# Patient Record
Sex: FEMALE | Race: WHITE
[De-identification: names, ages, dates, MRNs, and addresses within clinical notes are randomized per-mention and may not be internally consistent; named-entity substitution may affect disease eponyms.]

---

## 2018-09-11 ENCOUNTER — HOSPITAL ENCOUNTER (EMERGENCY)
Dept: HOSPITAL 83 - ED | Age: 63
Discharge: HOME | End: 2018-09-11
Payer: MEDICARE

## 2018-09-11 VITALS — BODY MASS INDEX: 37.89 KG/M2 | WEIGHT: 250 LBS | HEIGHT: 67.99 IN

## 2018-09-11 VITALS — DIASTOLIC BLOOD PRESSURE: 72 MMHG

## 2018-09-11 DIAGNOSIS — S52.691A: ICD-10-CM
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
## 2018-09-11 DIAGNOSIS — Z88.5: ICD-10-CM

## 2018-09-11 DIAGNOSIS — Z98.84: ICD-10-CM

## 2018-09-11 DIAGNOSIS — Z91.018: ICD-10-CM

## 2018-09-11 DIAGNOSIS — Z79.899: ICD-10-CM
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
## 2018-09-11 DIAGNOSIS — S52.591A: Primary | ICD-10-CM

## 2018-09-19 ENCOUNTER — HOSPITAL ENCOUNTER (OUTPATIENT)
Dept: HOSPITAL 83 - US | Age: 63
Discharge: HOME | End: 2018-09-19
Attending: INTERNAL MEDICINE
Payer: MEDICARE

## 2018-09-19 DIAGNOSIS — R60.0: Primary | ICD-10-CM

## 2019-10-25 ENCOUNTER — HOSPITAL ENCOUNTER (EMERGENCY)
Dept: HOSPITAL 83 - ED | Age: 64
Discharge: HOME | End: 2019-10-25
Payer: MEDICARE

## 2019-10-25 VITALS — WEIGHT: 270 LBS | HEIGHT: 68.98 IN | BODY MASS INDEX: 39.99 KG/M2

## 2019-10-25 VITALS — DIASTOLIC BLOOD PRESSURE: 72 MMHG

## 2019-10-25 DIAGNOSIS — Z79.899: ICD-10-CM

## 2019-10-25 DIAGNOSIS — K29.70: Primary | ICD-10-CM

## 2019-10-25 DIAGNOSIS — Z90.49: ICD-10-CM

## 2019-10-25 DIAGNOSIS — Z88.6: ICD-10-CM

## 2019-10-25 DIAGNOSIS — Z98.84: ICD-10-CM

## 2019-10-25 DIAGNOSIS — Z91.018: ICD-10-CM

## 2019-10-25 LAB
ALBUMIN SERPL-MCNC: 3.4 GM/DL (ref 3.1–4.5)
ALP SERPL-CCNC: 137 U/L (ref 45–117)
ALT SERPL W P-5'-P-CCNC: 16 U/L (ref 12–78)
APPEARANCE UR: (no result)
AST SERPL-CCNC: 15 IU/L (ref 3–35)
BACTERIA #/AREA URNS HPF: (no result) /[HPF]
BASOPHILS # BLD AUTO: 2 % (ref 0–1)
BILIRUB UR QL STRIP: NEGATIVE
BUN SERPL-MCNC: 13 MG/DL (ref 7–24)
CHLORIDE SERPL-SCNC: 110 MMOL/L (ref 98–107)
COLOR UR: YELLOW
CREAT SERPL-MCNC: 0.8 MG/DL (ref 0.55–1.02)
EPI CELLS #/AREA URNS HPF: (no result) /[HPF]
ERYTHROCYTE [DISTWIDTH] IN BLOOD BY AUTOMATED COUNT: 13.9 % (ref 0–14.5)
GLUCOSE UR QL: NEGATIVE
HCT VFR BLD AUTO: 39.3 % (ref 37–47)
HGB BLD-MCNC: 12.1 G/DL (ref 12–16)
HGB UR QL STRIP: NEGATIVE
KETONES UR QL STRIP: NEGATIVE
LEUKOCYTE ESTERASE UR QL STRIP: NEGATIVE
LIPASE SERPL-CCNC: 61 U/L (ref 73–393)
MCH RBC QN AUTO: 26.1 PG (ref 27–31)
MCHC RBC AUTO-ENTMCNC: 30.8 G/DL (ref 33–37)
MCV RBC AUTO: 84.9 FL (ref 81–99)
MUCOUS THREADS URNS QL MICRO: (no result)
NITRITE UR QL STRIP: NEGATIVE
NRBC BLD QL AUTO: 0 10*3/UL (ref 0–0)
OVALOCYTES BLD QL SMEAR: (no result)
PH UR STRIP: 6.5 [PH] (ref 5–9)
PLATELET # BLD AUTO: 249 10*3/UL (ref 130–400)
PLATELET SUFFICIENCY: NORMAL
PMV BLD AUTO: 10.7 FL (ref 9.6–12.3)
POTASSIUM SERPL-SCNC: 4.4 MMOL/L (ref 3.5–5.1)
PROT SERPL-MCNC: 7.1 GM/DL (ref 6.4–8.2)
RBC # BLD AUTO: 4.63 10*6/UL (ref 4.1–5.1)
SODIUM SERPL-SCNC: 142 MMOL/L (ref 136–145)
SP GR UR: 1.02 (ref 1–1.03)
TOTAL CELLS COUNTED: 100 #CELLS
UROBILINOGEN UR STRIP-MCNC: 2 E.U./DL (ref 0.2–1)
WBC #/AREA URNS HPF: (no result) WBC/HPF (ref 0–5)
WBC NRBC COR # BLD AUTO: 7.3 10*3/UL (ref 4.8–10.8)

## 2019-11-13 ENCOUNTER — HOSPITAL ENCOUNTER (OUTPATIENT)
Dept: HOSPITAL 83 - RAD | Age: 64
Discharge: HOME | End: 2019-11-13
Attending: ORTHOPAEDIC SURGERY
Payer: MEDICARE

## 2019-11-13 DIAGNOSIS — M47.816: ICD-10-CM

## 2019-11-13 DIAGNOSIS — R10.813: ICD-10-CM

## 2019-11-13 DIAGNOSIS — M51.36: Primary | ICD-10-CM

## 2019-11-13 DIAGNOSIS — Z96.641: ICD-10-CM

## 2020-03-11 ENCOUNTER — HOSPITAL ENCOUNTER (EMERGENCY)
Dept: HOSPITAL 83 - ED | Age: 65
Discharge: HOME | End: 2020-03-11
Payer: MEDICARE

## 2020-03-11 VITALS — WEIGHT: 270 LBS | BODY MASS INDEX: 40.92 KG/M2 | HEIGHT: 67.99 IN

## 2020-03-11 VITALS — DIASTOLIC BLOOD PRESSURE: 70 MMHG

## 2020-03-11 DIAGNOSIS — F32.9: ICD-10-CM

## 2020-03-11 DIAGNOSIS — M19.90: ICD-10-CM

## 2020-03-11 DIAGNOSIS — Z88.8: ICD-10-CM

## 2020-03-11 DIAGNOSIS — F41.9: ICD-10-CM

## 2020-03-11 DIAGNOSIS — E78.00: ICD-10-CM

## 2020-03-11 DIAGNOSIS — Z79.899: ICD-10-CM

## 2020-03-11 DIAGNOSIS — Z86.73: ICD-10-CM

## 2020-03-11 DIAGNOSIS — I10: ICD-10-CM

## 2020-03-11 DIAGNOSIS — M54.9: Primary | ICD-10-CM

## 2020-03-11 DIAGNOSIS — G89.29: ICD-10-CM

## 2020-03-11 DIAGNOSIS — Z91.018: ICD-10-CM

## 2020-05-04 ENCOUNTER — HOSPITAL ENCOUNTER (OUTPATIENT)
Age: 65
Discharge: HOME | End: 2020-05-06

## 2020-05-04 LAB
ABO/RH: NORMAL
ANTIBODY SCREEN: NORMAL

## 2020-05-04 PROCEDURE — 86901 BLOOD TYPING SEROLOGIC RH(D): CPT

## 2020-05-04 PROCEDURE — 86900 BLOOD TYPING SEROLOGIC ABO: CPT

## 2020-05-04 PROCEDURE — 87081 CULTURE SCREEN ONLY: CPT

## 2020-05-04 PROCEDURE — 86850 RBC ANTIBODY SCREEN: CPT

## 2020-05-06 LAB — MRSA CULTURE ONLY: NORMAL

## 2020-05-12 ENCOUNTER — HOSPITAL ENCOUNTER (OUTPATIENT)
Age: 65
Discharge: HOME | End: 2020-05-14

## 2020-05-12 LAB
ANION GAP SERPL CALCULATED.3IONS-SCNC: 9 MMOL/L (ref 7–16)
BUN BLDV-MCNC: 13 MG/DL (ref 8–23)
CALCIUM SERPL-MCNC: 8.8 MG/DL (ref 8.6–10.2)
CHLORIDE BLD-SCNC: 102 MMOL/L (ref 98–107)
CO2: 26 MMOL/L (ref 22–29)
CREAT SERPL-MCNC: 0.8 MG/DL (ref 0.5–1)
GFR AFRICAN AMERICAN: >60
GFR NON-AFRICAN AMERICAN: >60 ML/MIN/1.73
GLUCOSE BLD-MCNC: 127 MG/DL (ref 74–99)
HCT VFR BLD CALC: 37 % (ref 34–48)
HEMOGLOBIN: 11.7 G/DL (ref 11.5–15.5)
MCH RBC QN AUTO: 27.9 PG (ref 26–35)
MCHC RBC AUTO-ENTMCNC: 31.6 % (ref 32–34.5)
MCV RBC AUTO: 88.3 FL (ref 80–99.9)
PDW BLD-RTO: 15.5 FL (ref 11.5–15)
PLATELET # BLD: 227 E9/L (ref 130–450)
PMV BLD AUTO: 10.7 FL (ref 7–12)
POTASSIUM SERPL-SCNC: 4.6 MMOL/L (ref 3.5–5)
RBC # BLD: 4.19 E12/L (ref 3.5–5.5)
SODIUM BLD-SCNC: 137 MMOL/L (ref 132–146)
WBC # BLD: 9.4 E9/L (ref 4.5–11.5)

## 2020-05-12 PROCEDURE — 80048 BASIC METABOLIC PNL TOTAL CA: CPT

## 2020-05-12 PROCEDURE — 85027 COMPLETE CBC AUTOMATED: CPT

## 2020-05-13 ENCOUNTER — HOSPITAL ENCOUNTER (OUTPATIENT)
Age: 65
Discharge: HOME | End: 2020-05-15

## 2020-05-13 LAB
ANION GAP SERPL CALCULATED.3IONS-SCNC: 10 MMOL/L (ref 7–16)
BUN BLDV-MCNC: 13 MG/DL (ref 8–23)
CALCIUM SERPL-MCNC: 9.2 MG/DL (ref 8.6–10.2)
CHLORIDE BLD-SCNC: 99 MMOL/L (ref 98–107)
CO2: 29 MMOL/L (ref 22–29)
CREAT SERPL-MCNC: 0.9 MG/DL (ref 0.5–1)
GFR AFRICAN AMERICAN: >60
GFR NON-AFRICAN AMERICAN: >60 ML/MIN/1.73
GLUCOSE BLD-MCNC: 121 MG/DL (ref 74–99)
HCT VFR BLD CALC: 39.1 % (ref 34–48)
HEMOGLOBIN: 12.3 G/DL (ref 11.5–15.5)
MCH RBC QN AUTO: 28.1 PG (ref 26–35)
MCHC RBC AUTO-ENTMCNC: 31.5 % (ref 32–34.5)
MCV RBC AUTO: 89.5 FL (ref 80–99.9)
PDW BLD-RTO: 16.3 FL (ref 11.5–15)
PLATELET # BLD: 228 E9/L (ref 130–450)
PMV BLD AUTO: 10.5 FL (ref 7–12)
POTASSIUM SERPL-SCNC: 4.3 MMOL/L (ref 3.5–5)
RBC # BLD: 4.37 E12/L (ref 3.5–5.5)
SODIUM BLD-SCNC: 138 MMOL/L (ref 132–146)
WBC # BLD: 9.7 E9/L (ref 4.5–11.5)

## 2020-05-13 PROCEDURE — 85027 COMPLETE CBC AUTOMATED: CPT

## 2020-05-13 PROCEDURE — 80048 BASIC METABOLIC PNL TOTAL CA: CPT

## 2020-07-27 ENCOUNTER — HOSPITAL ENCOUNTER (OUTPATIENT)
Dept: HOSPITAL 83 - COVID19 | Age: 65
Discharge: HOME | End: 2020-07-27
Attending: INTERNAL MEDICINE
Payer: MEDICARE

## 2020-07-27 DIAGNOSIS — R50.9: Primary | ICD-10-CM

## 2020-07-27 DIAGNOSIS — Z20.828: ICD-10-CM

## 2021-05-13 ENCOUNTER — HOSPITAL ENCOUNTER (OUTPATIENT)
Dept: HOSPITAL 83 - LAB | Age: 66
Discharge: HOME | End: 2021-05-13
Attending: PHYSICIAN ASSISTANT
Payer: MEDICARE

## 2021-05-13 DIAGNOSIS — L40.8: ICD-10-CM

## 2021-05-13 DIAGNOSIS — I73.00: Primary | ICD-10-CM

## 2021-05-13 DIAGNOSIS — L60.3: ICD-10-CM

## 2021-05-13 LAB
ALBUMIN SERPL-MCNC: 3.4 GM/DL (ref 3.1–4.5)
ALP SERPL-CCNC: 115 U/L (ref 45–117)
ALT SERPL W P-5'-P-CCNC: 13 U/L (ref 12–78)
AST SERPL-CCNC: 8 IU/L (ref 3–35)
BASOPHILS # BLD AUTO: 0.1 10*3/UL (ref 0–0.1)
BASOPHILS NFR BLD AUTO: 0.7 % (ref 0–1)
BUN SERPL-MCNC: 14 MG/DL (ref 7–24)
CHLORIDE SERPL-SCNC: 104 MMOL/L (ref 98–107)
CREAT SERPL-MCNC: 0.94 MG/DL (ref 0.55–1.02)
EOSINOPHIL # BLD AUTO: 0.3 10*3/UL (ref 0–0.4)
EOSINOPHIL # BLD AUTO: 3.8 % (ref 1–4)
ERYTHROCYTE [DISTWIDTH] IN BLOOD BY AUTOMATED COUNT: 14.2 % (ref 0–14.5)
HCT VFR BLD AUTO: 38.1 % (ref 37–47)
LYMPHOCYTES # BLD AUTO: 1.4 10*3/UL (ref 1.3–4.4)
LYMPHOCYTES NFR BLD AUTO: 19.1 % (ref 27–41)
MCH RBC QN AUTO: 26.3 PG (ref 27–31)
MCHC RBC AUTO-ENTMCNC: 31.2 G/DL (ref 33–37)
MCV RBC AUTO: 84.3 FL (ref 81–99)
MONOCYTES # BLD AUTO: 0.5 10*3/UL (ref 0.1–1)
MONOCYTES NFR BLD MANUAL: 6.7 % (ref 3–9)
NEUT #: 4.9 10*3/UL (ref 2.3–7.9)
NEUT %: 69.1 % (ref 47–73)
NRBC BLD QL AUTO: 0 10*3/UL (ref 0–0)
PLATELET # BLD AUTO: 335 10*3/UL (ref 130–400)
PMV BLD AUTO: 10.4 FL (ref 9.6–12.3)
POTASSIUM SERPL-SCNC: 4.1 MMOL/L (ref 3.5–5.1)
PROT SERPL-MCNC: 7.5 GM/DL (ref 6.4–8.2)
RBC # BLD AUTO: 4.52 10*6/UL (ref 4.1–5.1)
SODIUM SERPL-SCNC: 138 MMOL/L (ref 136–145)
T4 FREE SERPL-MCNC: 1.13 NG/DL (ref 0.76–1.46)
TSH SERPL DL<=0.005 MIU/L-ACNC: 2.51 UIU/ML (ref 0.36–4.75)
WBC NRBC COR # BLD AUTO: 7.1 10*3/UL (ref 4.8–10.8)

## 2021-06-21 ENCOUNTER — HOSPITAL ENCOUNTER (OUTPATIENT)
Dept: HOSPITAL 83 - MAMMO | Age: 66
Discharge: HOME | End: 2021-06-21
Attending: INTERNAL MEDICINE
Payer: MEDICARE

## 2021-06-21 DIAGNOSIS — Z12.31: Primary | ICD-10-CM

## 2022-05-28 ENCOUNTER — HOSPITAL ENCOUNTER (INPATIENT)
Age: 67
LOS: 7 days | Discharge: NURSING, REHAB OR LONG-TERM CARE | DRG: 246 | End: 2022-06-06
Attending: STUDENT IN AN ORGANIZED HEALTH CARE EDUCATION/TRAINING PROGRAM | Admitting: INTERNAL MEDICINE
Payer: MEDICARE

## 2022-05-28 ENCOUNTER — HOSPITAL ENCOUNTER (EMERGENCY)
Dept: HOSPITAL 83 - ED | Age: 67
Discharge: ANOTHER HOSPITAL | End: 2022-05-28
Payer: MEDICARE

## 2022-05-28 VITALS — SYSTOLIC BLOOD PRESSURE: 129 MMHG | DIASTOLIC BLOOD PRESSURE: 74 MMHG

## 2022-05-28 VITALS — HEIGHT: 55 IN

## 2022-05-28 DIAGNOSIS — I21.3: Primary | ICD-10-CM

## 2022-05-28 DIAGNOSIS — M79.7 FIBROMYALGIA: Primary | ICD-10-CM

## 2022-05-28 LAB
ABO/RH: NORMAL
ALBUMIN SERPL-MCNC: 4 G/DL (ref 3.5–5.2)
ALP BLD-CCNC: 116 U/L (ref 35–104)
ALP SERPL-CCNC: 99 U/L (ref 45–117)
ALT SERPL W P-5'-P-CCNC: 18 U/L (ref 12–78)
ALT SERPL-CCNC: 55 U/L (ref 0–32)
ANION GAP SERPL CALCULATED.3IONS-SCNC: 12 MMOL/L (ref 7–16)
ANTIBODY SCREEN: NORMAL
APTT PPP: 24.8 SECONDS (ref 20–32.1)
AST SERPL-CCNC: 13 IU/L (ref 3–35)
AST SERPL-CCNC: 251 U/L (ref 0–31)
BASOPHILS # BLD AUTO: 0 10*3/UL (ref 0–0.1)
BASOPHILS NFR BLD AUTO: 0.6 % (ref 0–1)
BILIRUB SERPL-MCNC: 0.6 MG/DL (ref 0–1.2)
BUN BLDV-MCNC: 17 MG/DL (ref 6–23)
BUN SERPL-MCNC: 17 MG/DL (ref 7–24)
CALCIUM SERPL-MCNC: 9 MG/DL (ref 8.6–10.2)
CHLORIDE BLD-SCNC: 100 MMOL/L (ref 98–107)
CHLORIDE SERPL-SCNC: 112 MMOL/L (ref 98–107)
CO2: 23 MMOL/L (ref 22–29)
CREAT SERPL-MCNC: 1 MG/DL (ref 0.5–1)
CREAT SERPL-MCNC: 1.05 MG/DL (ref 0.55–1.02)
EOSINOPHIL # BLD AUTO: 0.3 10*3/UL (ref 0–0.4)
EOSINOPHIL # BLD AUTO: 4.7 % (ref 1–4)
ERYTHROCYTE [DISTWIDTH] IN BLOOD BY AUTOMATED COUNT: 15.3 % (ref 0–14.5)
GFR AFRICAN AMERICAN: >60
GFR NON-AFRICAN AMERICAN: 55 ML/MIN/1.73
GLUCOSE BLD-MCNC: 144 MG/DL (ref 74–99)
HCT VFR BLD AUTO: 30.8 % (ref 37–47)
INR BLD: 1.1 (ref 2–3.5)
LYMPHOCYTES # BLD AUTO: 1 10*3/UL (ref 1.3–4.4)
LYMPHOCYTES NFR BLD AUTO: 16.6 % (ref 27–41)
MCH RBC QN AUTO: 25.7 PG (ref 27–31)
MCHC RBC AUTO-ENTMCNC: 30.8 G/DL (ref 33–37)
MCV RBC AUTO: 83.2 FL (ref 81–99)
MONOCYTES # BLD AUTO: 0.5 10*3/UL (ref 0.1–1)
MONOCYTES NFR BLD MANUAL: 7.7 % (ref 3–9)
NEUT #: 4.4 10*3/UL (ref 2.3–7.9)
NEUT %: 69.9 % (ref 47–73)
NRBC BLD QL AUTO: 0 % (ref 0–0)
PLATELET # BLD AUTO: 258 10*3/UL (ref 130–400)
PMV BLD AUTO: 10 FL (ref 9.6–12.3)
POC ACT LR: 181 SECONDS
POC ACT LR: 285 SECONDS
POC ACT LR: 339 SECONDS
POTASSIUM SERPL-SCNC: 4.4 MMOL/L (ref 3.5–5.1)
POTASSIUM SERPL-SCNC: 4.5 MMOL/L (ref 3.5–5)
PROT SERPL-MCNC: 6.2 GM/DL (ref 6.4–8.2)
RBC # BLD AUTO: 3.7 10*6/UL (ref 4.1–5.1)
SODIUM BLD-SCNC: 135 MMOL/L (ref 132–146)
SODIUM SERPL-SCNC: 141 MMOL/L (ref 136–145)
TOTAL PROTEIN: 6.7 G/DL (ref 6.4–8.3)
WBC NRBC COR # BLD AUTO: 6.2 10*3/UL (ref 4.8–10.8)

## 2022-05-28 PROCEDURE — 36415 COLL VENOUS BLD VENIPUNCTURE: CPT

## 2022-05-28 PROCEDURE — 6370000000 HC RX 637 (ALT 250 FOR IP): Performed by: INTERNAL MEDICINE

## 2022-05-28 PROCEDURE — G0379 DIRECT REFER HOSPITAL OBSERV: HCPCS

## 2022-05-28 PROCEDURE — 2500000003 HC RX 250 WO HCPCS

## 2022-05-28 PROCEDURE — 86850 RBC ANTIBODY SCREEN: CPT

## 2022-05-28 PROCEDURE — C1894 INTRO/SHEATH, NON-LASER: HCPCS

## 2022-05-28 PROCEDURE — 99205 OFFICE O/P NEW HI 60 MIN: CPT | Performed by: INTERNAL MEDICINE

## 2022-05-28 PROCEDURE — C1769 GUIDE WIRE: HCPCS

## 2022-05-28 PROCEDURE — 2709999900 HC NON-CHARGEABLE SUPPLY

## 2022-05-28 PROCEDURE — 4A023N7 MEASUREMENT OF CARDIAC SAMPLING AND PRESSURE, LEFT HEART, PERCUTANEOUS APPROACH: ICD-10-PCS | Performed by: INTERNAL MEDICINE

## 2022-05-28 PROCEDURE — C1874 STENT, COATED/COV W/DEL SYS: HCPCS

## 2022-05-28 PROCEDURE — C9606 PERC D-E COR REVASC W AMI S: HCPCS

## 2022-05-28 PROCEDURE — B2111ZZ FLUOROSCOPY OF MULTIPLE CORONARY ARTERIES USING LOW OSMOLAR CONTRAST: ICD-10-PCS | Performed by: INTERNAL MEDICINE

## 2022-05-28 PROCEDURE — 92941 PRQ TRLML REVSC TOT OCCL AMI: CPT | Performed by: INTERNAL MEDICINE

## 2022-05-28 PROCEDURE — 80053 COMPREHEN METABOLIC PANEL: CPT

## 2022-05-28 PROCEDURE — 93458 L HRT ARTERY/VENTRICLE ANGIO: CPT | Performed by: INTERNAL MEDICINE

## 2022-05-28 PROCEDURE — 93458 L HRT ARTERY/VENTRICLE ANGIO: CPT

## 2022-05-28 PROCEDURE — C1753 CATH, INTRAVAS ULTRASOUND: HCPCS

## 2022-05-28 PROCEDURE — C1887 CATHETER, GUIDING: HCPCS

## 2022-05-28 PROCEDURE — 2580000003 HC RX 258: Performed by: INTERNAL MEDICINE

## 2022-05-28 PROCEDURE — 027035Z DILATION OF CORONARY ARTERY, ONE ARTERY WITH TWO DRUG-ELUTING INTRALUMINAL DEVICES, PERCUTANEOUS APPROACH: ICD-10-PCS | Performed by: INTERNAL MEDICINE

## 2022-05-28 PROCEDURE — 93005 ELECTROCARDIOGRAM TRACING: CPT | Performed by: INTERNAL MEDICINE

## 2022-05-28 PROCEDURE — 2700000000 HC OXYGEN THERAPY PER DAY

## 2022-05-28 PROCEDURE — 86900 BLOOD TYPING SEROLOGIC ABO: CPT

## 2022-05-28 PROCEDURE — C1725 CATH, TRANSLUMIN NON-LASER: HCPCS

## 2022-05-28 PROCEDURE — 86901 BLOOD TYPING SEROLOGIC RH(D): CPT

## 2022-05-28 PROCEDURE — 85347 COAGULATION TIME ACTIVATED: CPT

## 2022-05-28 PROCEDURE — 92978 ENDOLUMINL IVUS OCT C 1ST: CPT

## 2022-05-28 PROCEDURE — 6360000002 HC RX W HCPCS

## 2022-05-28 PROCEDURE — G0378 HOSPITAL OBSERVATION PER HR: HCPCS

## 2022-05-28 RX ORDER — ROPINIROLE 2 MG/1
2 TABLET, FILM COATED ORAL NIGHTLY
COMMUNITY

## 2022-05-28 RX ORDER — PREGABALIN 100 MG/1
100 CAPSULE ORAL 3 TIMES DAILY
COMMUNITY

## 2022-05-28 RX ORDER — HYDROCODONE BITARTRATE AND ACETAMINOPHEN 5; 325 MG/1; MG/1
1 TABLET ORAL EVERY 6 HOURS PRN
Status: DISCONTINUED | OUTPATIENT
Start: 2022-05-28 | End: 2022-05-29

## 2022-05-28 RX ORDER — HYDROXYZINE PAMOATE 50 MG/1
50 CAPSULE ORAL 3 TIMES DAILY PRN
Status: DISCONTINUED | OUTPATIENT
Start: 2022-05-28 | End: 2022-06-06 | Stop reason: HOSPADM

## 2022-05-28 RX ORDER — OMEPRAZOLE 20 MG/1
20 CAPSULE, DELAYED RELEASE ORAL DAILY
COMMUNITY

## 2022-05-28 RX ORDER — METOPROLOL SUCCINATE 25 MG/1
25 TABLET, EXTENDED RELEASE ORAL DAILY
Status: DISCONTINUED | OUTPATIENT
Start: 2022-05-28 | End: 2022-05-29

## 2022-05-28 RX ORDER — ASPIRIN 81 MG/1
81 TABLET ORAL DAILY
Status: DISCONTINUED | OUTPATIENT
Start: 2022-05-28 | End: 2022-05-29

## 2022-05-28 RX ORDER — HYDROXYZINE 50 MG/1
50 TABLET, FILM COATED ORAL 3 TIMES DAILY PRN
COMMUNITY

## 2022-05-28 RX ORDER — CLOPIDOGREL BISULFATE 75 MG/1
75 TABLET ORAL DAILY
Status: DISCONTINUED | OUTPATIENT
Start: 2022-05-29 | End: 2022-06-06 | Stop reason: HOSPADM

## 2022-05-28 RX ORDER — PROPRANOLOL HYDROCHLORIDE 80 MG/1
80 TABLET ORAL DAILY
Status: ON HOLD | COMMUNITY
End: 2022-06-06 | Stop reason: HOSPADM

## 2022-05-28 RX ORDER — SODIUM CHLORIDE 9 MG/ML
INJECTION, SOLUTION INTRAVENOUS PRN
Status: DISCONTINUED | OUTPATIENT
Start: 2022-05-28 | End: 2022-06-06 | Stop reason: HOSPADM

## 2022-05-28 RX ORDER — CLOPIDOGREL BISULFATE 75 MG/1
225 TABLET ORAL ONCE
Status: COMPLETED | OUTPATIENT
Start: 2022-05-28 | End: 2022-05-28

## 2022-05-28 RX ORDER — HYDROCODONE BITARTRATE AND ACETAMINOPHEN 7.5; 3 MG/1; MG/1
1 TABLET ORAL EVERY 8 HOURS PRN
Status: ON HOLD | COMMUNITY
End: 2022-06-06 | Stop reason: HOSPADM

## 2022-05-28 RX ORDER — ATORVASTATIN CALCIUM 40 MG/1
40 TABLET, FILM COATED ORAL NIGHTLY
Status: DISCONTINUED | OUTPATIENT
Start: 2022-05-28 | End: 2022-05-30

## 2022-05-28 RX ORDER — SODIUM CHLORIDE 0.9 % (FLUSH) 0.9 %
5-40 SYRINGE (ML) INJECTION EVERY 12 HOURS SCHEDULED
Status: DISCONTINUED | OUTPATIENT
Start: 2022-05-28 | End: 2022-06-06 | Stop reason: HOSPADM

## 2022-05-28 RX ORDER — PANTOPRAZOLE SODIUM 40 MG/1
40 TABLET, DELAYED RELEASE ORAL
Status: DISCONTINUED | OUTPATIENT
Start: 2022-05-29 | End: 2022-06-06 | Stop reason: HOSPADM

## 2022-05-28 RX ORDER — ROPINIROLE 2 MG/1
2 TABLET, FILM COATED ORAL NIGHTLY
Status: DISCONTINUED | OUTPATIENT
Start: 2022-05-28 | End: 2022-06-06 | Stop reason: HOSPADM

## 2022-05-28 RX ORDER — ISOSORBIDE MONONITRATE 30 MG/1
30 TABLET, EXTENDED RELEASE ORAL DAILY
Status: DISCONTINUED | OUTPATIENT
Start: 2022-05-28 | End: 2022-06-01

## 2022-05-28 RX ORDER — ACETAMINOPHEN 325 MG/1
650 TABLET ORAL EVERY 4 HOURS PRN
Status: DISCONTINUED | OUTPATIENT
Start: 2022-05-28 | End: 2022-06-06 | Stop reason: HOSPADM

## 2022-05-28 RX ORDER — NITROGLYCERIN 0.4 MG/1
0.4 TABLET SUBLINGUAL EVERY 5 MIN PRN
Status: DISCONTINUED | OUTPATIENT
Start: 2022-05-28 | End: 2022-06-06 | Stop reason: HOSPADM

## 2022-05-28 RX ORDER — SODIUM CHLORIDE 0.9 % (FLUSH) 0.9 %
5-40 SYRINGE (ML) INJECTION PRN
Status: DISCONTINUED | OUTPATIENT
Start: 2022-05-28 | End: 2022-06-06 | Stop reason: HOSPADM

## 2022-05-28 RX ORDER — ZOLPIDEM TARTRATE 10 MG/1
TABLET ORAL NIGHTLY
COMMUNITY

## 2022-05-28 RX ADMIN — HYDROCODONE BITARTRATE AND ACETAMINOPHEN 1 TABLET: 5; 325 TABLET ORAL at 13:45

## 2022-05-28 RX ADMIN — SODIUM CHLORIDE, PRESERVATIVE FREE 10 ML: 5 INJECTION INTRAVENOUS at 10:04

## 2022-05-28 RX ADMIN — ROPINIROLE HYDROCHLORIDE 2 MG: 2 TABLET, FILM COATED ORAL at 20:31

## 2022-05-28 RX ADMIN — ATORVASTATIN CALCIUM 40 MG: 40 TABLET, FILM COATED ORAL at 20:32

## 2022-05-28 RX ADMIN — HYDROCODONE BITARTRATE AND ACETAMINOPHEN 1 TABLET: 5; 325 TABLET ORAL at 20:29

## 2022-05-28 RX ADMIN — APIXABAN 5 MG: 5 TABLET, FILM COATED ORAL at 10:04

## 2022-05-28 RX ADMIN — ISOSORBIDE MONONITRATE 30 MG: 30 TABLET, EXTENDED RELEASE ORAL at 07:55

## 2022-05-28 RX ADMIN — APIXABAN 5 MG: 5 TABLET, FILM COATED ORAL at 20:31

## 2022-05-28 RX ADMIN — ACETAMINOPHEN 650 MG: 325 TABLET ORAL at 10:03

## 2022-05-28 RX ADMIN — CLOPIDOGREL BISULFATE 225 MG: 75 TABLET ORAL at 10:03

## 2022-05-28 RX ADMIN — HYDROXYZINE PAMOATE 50 MG: 50 CAPSULE ORAL at 23:35

## 2022-05-28 RX ADMIN — SACUBITRIL AND VALSARTAN 1 TABLET: 24; 26 TABLET, FILM COATED ORAL at 20:31

## 2022-05-28 RX ADMIN — HYDROXYZINE PAMOATE 50 MG: 50 CAPSULE ORAL at 17:35

## 2022-05-28 RX ADMIN — ASPIRIN 81 MG: 81 TABLET, COATED ORAL at 10:03

## 2022-05-28 RX ADMIN — METOPROLOL SUCCINATE 25 MG: 25 TABLET, EXTENDED RELEASE ORAL at 10:04

## 2022-05-28 RX ADMIN — SACUBITRIL AND VALSARTAN 1 TABLET: 24; 26 TABLET, FILM COATED ORAL at 10:03

## 2022-05-28 RX ADMIN — SODIUM CHLORIDE, PRESERVATIVE FREE 10 ML: 5 INJECTION INTRAVENOUS at 20:32

## 2022-05-28 ASSESSMENT — PAIN DESCRIPTION - LOCATION
LOCATION: CHEST

## 2022-05-28 ASSESSMENT — PAIN - FUNCTIONAL ASSESSMENT
PAIN_FUNCTIONAL_ASSESSMENT: PREVENTS OR INTERFERES SOME ACTIVE ACTIVITIES AND ADLS
PAIN_FUNCTIONAL_ASSESSMENT: PREVENTS OR INTERFERES SOME ACTIVE ACTIVITIES AND ADLS

## 2022-05-28 ASSESSMENT — PAIN DESCRIPTION - DESCRIPTORS
DESCRIPTORS: DISCOMFORT;NAGGING
DESCRIPTORS: HEAVINESS;PRESSURE;THROBBING
DESCRIPTORS: DISCOMFORT;HEAVINESS;PRESSURE

## 2022-05-28 ASSESSMENT — PAIN DESCRIPTION - PAIN TYPE
TYPE: ACUTE PAIN
TYPE: ACUTE PAIN

## 2022-05-28 ASSESSMENT — PAIN SCALES - GENERAL
PAINLEVEL_OUTOF10: 7
PAINLEVEL_OUTOF10: 7
PAINLEVEL_OUTOF10: 8
PAINLEVEL_OUTOF10: 3
PAINLEVEL_OUTOF10: 8
PAINLEVEL_OUTOF10: 7
PAINLEVEL_OUTOF10: 3

## 2022-05-28 ASSESSMENT — PAIN DESCRIPTION - ORIENTATION
ORIENTATION: MID

## 2022-05-28 ASSESSMENT — PAIN DESCRIPTION - ONSET: ONSET: ON-GOING

## 2022-05-28 ASSESSMENT — PAIN DESCRIPTION - FREQUENCY
FREQUENCY: CONTINUOUS
FREQUENCY: CONTINUOUS

## 2022-05-28 NOTE — PROCEDURES
[address]                  [address] [facility]  [facility]                            CARDIAC CATHETERIZATION    PATIENT NAME: Paty Donald                    :        1955  MED REC NO:   [id number]                            ROOM:       4652  ACCOUNT NO:   [de-identified]                           ADMIT DATE: 2022  PROVIDER:     Artur Powell MD    DATE OF PROCEDURE:  2022    PROCEDURE:  Emergency left heart catheterization and primary PCI. INDICATION FOR PROCEDURE:  Acute ST elevation anterolateral myocardial  infarction. PROCEDURE NOTE:  The patient presented to the emergency room at [facility] last night due to chest pain. She was found to have ST  elevation in lead I and aVL as well as lead V2 with some reciprocal  change in the inferior leads. She was given four baby aspirins, 2  tablets of Brilinta, a bolus of heparin and was started on heparin drip  prior to be transferred for emergency left heart catheterization. On  arrival to the cath lab, she was still complaining of chest discomfort. Under sterile condition and under local anesthetic and using conscious  sedation, a 6-Lao Terumo slender sheath was inserted into the right  radial artery with difficulty due to very poor pulse and the artery was  not visualized under ultrasound. The patient's ACT was 181. She  received 5000 units of intravenous heparin. She also received 200 mcg  of intraarterial nitroglycerin via the right radial sheath. Then a  6-Lao EBU 3.5 guide catheter was advanced over a J-tip wire to the  ascending aorta without difficulty. The left main coronary artery was  engaged and a coronary angiogram was done. FINDINGS:  HEMODYNAMICS:  Aortic pressure 117/95 mmHg. CORONARY ANATOMY:  LEFT MAIN:  It is a long and large artery with no angiographic stenosis  noted.   LAD:  It is a large artery giving rise to a large diagonal branch and  several septal perforators. The LAD was calcified in its proximal to  mid segment. There was 50% discrete eccentric proximal to mid LAD  stenosis. The LAD was 100% occluded in the mid to distal segment with  MARIA LUZ-0 flow distally. LEFT CIRCUMFLEX:  It is a large artery giving rise to a very small left  first obtuse marginal branch, a small second obtuse marginal branch, a  fair size third obtuse marginal branch that continues to AV groove. No  significant angiographic stenosis noted in circumflex or its branches. CORONARY INTERVENTION NOTE:  A Runthrough wire was advanced to the  apical LAD without difficulty. A 2.5 x 12 Euphora balloon was inflated  six times at the 12 atmospheres at the mid LAD with persistence of no  reflow. The patient then received 200 mcg of intracoronary  nitroglycerin and 250 mcg of intracoronary Cardene. There was  persistence of no reflow. Then, the 2.5 x 12 balloon was inflated again  four times at the site of the stenosis with persistence of no reflow. Then the patient received two doses of intracoronary Nipride of 100 mcg. Then a 2.5 x 34 Resolute Curtis stent was deployed at 12 atmospheres at  the mid to distal LAD. There was persistence of no reflow. Then the  same balloon 2.5 x 12 was inflated several times at the mid to distal  LAD at 12 atmospheres with persistence of no reflow. Then, a 2.5 x 30  Resolute [place] stent was deployed at 12 atmospheres at the distal LAD and  overlapping with the previously placed stent. This stent was deployed  at 12 atmospheres. Then both stents were postdilated using 3.0 x 20 NC  Euphora balloon inflated at 16 atmospheres with persistence of no  reflow. Then IVUS [place] catheter was advanced to the apical LAD and  manual pullback was obtained. IVUS revealed no edge dissections. The  stents were well apposed. There was moderate calcified mid-LAD stenosis  noted. At the end of the PCI, the wire was pulled out.   To mention, the  patient received an extra 2000 unit of intravenous heparin during the  PCI. Her ACT was 339. Then the guide catheter was exchanged over a  guidewire to a 5-Angolan TIG diagnostic catheter, which was used to  engage the right coronary artery and an angiogram was done. This  catheter was exchanged over a guidewire to a 5-Angolan pigtail, which was  advanced into the left ventricle with minimal difficulty. The left  ventricular end-diastolic pressure was measured. No left ventriculogram  was done due to significantly elevated LVEDP. There was no significant  gradient across the aortic valve. At the end of the procedure, the  pigtail was pulled out. The patient's ACT was 289. The Terumo slender  sheath was pulled out and a Vasc Band was applied over the right radial  artery with good hemostasis. The patient tolerated the procedure very  well and no complications were noted. No significant blood loss  occurred during the procedure. RIGHT CORONARY ARTERY:  It is a large dominant artery giving rise to  three RV marginal branches, a PDA and PLV branch. There was 30%-40%  discrete mid RCA stenosis. There was 40%-50% discrete proximal and  50%-60% discrete mid PDA stenosis. LVEDP was 43 mmHg. IMPRESSION:  1. Acute 100% occlusion of mid to distal LAD ( ? Embolic event due to PAF versus   Ruptured plaque ), status post primary PCI using two drug-eluting stents with  persistence of no reflow despite vasodilator therapy. 2.  50%-60% mid PDA stenosis. 3.  Elevated LVEDP at 43 mmHg. 4. PAF during PCI. RECOMMENDATIONS:  1. Continue baby aspirin for 1 week. 2.  Load with Plavix in a.m., then daily. 3.  Start Eliquis 5 mg twice daily in a.m.  4.  Start atorvastatin 40 mg daily. 5.  Toprol and Entresto will be started depending on the patient's heart  rate and blood pressure. 6.  Obtain an echocardiogram in a.m. to assess the patient's ejection  fraction.   7.  Risk-factor modification including weight loss. 8.  Cardiac rehab post hospital discharge. PROCEDURE START TIME:  03:01 a.m. PROCEDURE END TIME:  04:24 a.m. FLUOROSCOPY TIME:  15.7 minutes. CONTRAST VOLUME:  222 mL. CONSCIOUS SEDATION:  1 mg of intravenous Versed and 150 mcg of  intravenous fentanyl.         Eileen Carrasco MD    D: 05/28/2022 5:09:45       T: 05/28/2022 5:12:00     [id number]  Job#: [id number]     Doc#: [id number]    CC:

## 2022-05-28 NOTE — H&P
Inpatient H&P      PCP:  No primary care provider on file. Admitting Physician:  Santos Campuzano MD  Consultants:  Cardio  Chief Complaint:  No chief complaint on file. History of Present Illness  Quyen Stuart is a 77 y.o. female who presents to [facility] for chest pain. Quyen Stuart has a past medical history that includes hypertension, hyperlipidemia, fibromyalgia, depression, anxiety, diabetes. Prieto Lara originally presented to [facility] beds at the [facility] ER with complaint of chest pain. In the ER she was found to have STEMI with ST elevations in lead I and aVL and lead V2. She was given aspirin Brilinta, heparin bolus, started on heparin drip and transferred emergently to [facility] for cardiac catheterization. She was found to have acute 100% occlusion of mid to distal LAD ( ? Embolic event due to PAF versus ruptured plaque ), status post primary PCI using two drug-eluting stents with persistence of no reflow despite vasodilator therapy. 50%-60% mid PDA stenosis. PAF during PCI.           Last Hospital Admission -   None in EMR    Last Echocardiogram -   None in EMR     TRIAGE VITALS  BP: (!) 137/98, Temp: 97.6 °F (36.4 °C), Heart Rate: 71, Resp: 24, SpO2: 100 %    Vitals:    05/28/22 0555 05/28/22 0610 05/28/22 0634 05/28/22 0704   BP: (!) 140/96 (!) 138/96 (!) 146/87 (!) 137/98   Pulse: 84 78 70 71   Resp:       Temp:       TempSrc:       SpO2:   100%    Weight:       Height:             Histories  Past Medical History:   Diagnosis Date    Anxiety     Arthritis     Asthma     Depression     [facility] ([facility]     Fibromyalgia     Hyperlipidemia     Hypertension     MI (myocardial infarction) ([facility] 05/28/2022    Restless leg syndrome     TIA (transient ischemic attack)      Past Surgical History:   Procedure Laterality Date    BREAST SURGERY      breast reduction    CARDIAC CATHETERIZATION      CHOLECYSTECTOMY      CORONARY ANGIOPLASTY WITH STENT PLACEMENT  05/28/2022    2.5 x 34mm Resolute [place] to Mid LAD, and 2.5 x 30mm Resolute Curtis to Distal LAD. Dr. Karlene Andrea knees     History reviewed. No pertinent family history. Home Medications  Prior to Admission medications    Not on File       Allergies  Dilaudid [hydromorphone]    Social Hx  Social History     Socioeconomic History    Marital status:      Spouse name: Not on file    Number of children: Not on file    Years of education: Not on file    Highest education level: Not on file   Occupational History    Not on file   Tobacco Use    Smoking status: Never Smoker    Smokeless tobacco: Never Used   Substance and Sexual Activity    Alcohol use: Not Currently    Drug use: Not Currently    Sexual activity: Not Currently   Other Topics Concern    Not on file   Social History Narrative    Not on file     Social Determinants of Health     Financial Resource Strain:     Difficulty of Paying Living Expenses: Not on file   Food Insecurity:     Worried About Running Out of Food in the Last Year: Not on file    Tess of Food in the Last Year: Not on file   Transportation Needs:     Lack of Transportation (Medical): Not on file    Lack of Transportation (Non-Medical):  Not on file   Physical Activity:     Days of Exercise per Week: Not on file    Minutes of Exercise per Session: Not on file   Stress:     Feeling of Stress : Not on file   Social Connections:     Frequency of Communication with Friends and Family: Not on file    Frequency of Social Gatherings with Friends and Family: Not on file    Attends Yazidism Services: Not on file    Active Member of Clubs or Organizations: Not on file    Attends Club or Organization Meetings: Not on file    Marital Status: Not on file   Intimate Partner Violence:     Fear of Current or Ex-Partner: Not on file    Emotionally Abused: Not on file    Physically Abused: Not on file    Sexually Abused: Not on file   Housing Stability:     Unable to Pay for Housing in the Last Year: Not on file    Number of Places Lived in the Last Year: Not on file    Unstable Housing in the Last Year: Not on file       Review of Systems  All bolded are positive; please see HPI  General:  Fever, chills, diaphoresis, fatigue, malaise, night sweats, weight loss  Psychological:  Anxiety, disorientation, hallucinations. ENT:  Epistaxis, headaches, vertigo, visual changes. Cardiovascular:  Chest pain, irregular heartbeats, palpitations, paroxysmal nocturnal dyspnea. Respiratory:  Shortness of breath, coughing, sputum production, hemoptysis, wheezing, orthopnea.   Gastrointestinal:  Nausea, vomiting, diarrhea, heartburn, constipation, abdominal pain, hematemesis, hematochezia, melena, acholic stools  Genito-Urinary:  Dysuria, urgency, frequency, hematuria  Musculoskeletal:  Joint pain, joint stiffness, joint swelling, muscle pain  Neurology:  Headache, focal neurological deficits, weakness, numbness, paresthesia  Derm:  Rashes, ulcers, excoriations, bruising  Extremities:  Decreased ROM, peripheral edema, mottling    Physical Examination  Vitals:  BP (!) 137/98   Pulse 71   Temp 97.6 °F (36.4 °C) (Temporal)   Resp 24   Ht 5' 5\" (1.651 m)   Wt 291 lb 7.2 oz (132.2 kg)   SpO2 100%   BMI 48.50 kg/m²   General Appearance:  awake, alert, and oriented to person, place, time, and purpose; appears stated age and cooperative; no apparent distress no labored breathing  HEENT:  NCAT; PERRL; conjunctiva pink, sclera clear  Neck:  no adenopathy, bruit, JVD, tenderness, masses, or nodules; supple, symmetrical, trachea midline, thyroid not enlarged  Lung:  clear to auscultation bilaterally; no use of accessory muscles; no rhonchi, rales, or crackles  Heart:  regular rate and regular rhythm without murmur, rub, or gallop  Abdomen:  soft, nontender, nondistended; normoactive bowel sounds; no organomegaly  Extremities:  extremities normal, atraumatic, no cyanosis or edema  Musculokeletal:  no joint swelling, no muscle tenderness. ROM normal in all joints of extremities. Neurologic:  mental status A&Ox3, thought content appropriate; CN II-XII grossly intact; sensation intact, motor strength 5/5 globally; no slurred speech    Laboratory Data  Recent Results (from the past 24 hour(s))   POC ACT-Low Range    Collection Time: 05/28/22  3:21 AM   Result Value Ref Range    POC ACT  Not established seconds   POC ACT-Low Range    Collection Time: 05/28/22  4:09 AM   Result Value Ref Range    POC ACT  Not established seconds   TYPE AND SCREEN    Collection Time: 05/28/22  4:20 AM   Result Value Ref Range    ABO/Rh A POS     Antibody Screen NEG    POC ACT-Low Range    Collection Time: 05/28/22  4:26 AM   Result Value Ref Range    POC ACT  Not established seconds   EKG 12 Lead    Collection Time: 05/28/22  5:54 AM   Result Value Ref Range    Ventricular Rate 72 BPM    Atrial Rate 72 BPM    P-R Interval 190 ms    QRS Duration 86 ms    Q-T Interval 406 ms    QTc Calculation (Bazett) 444 ms    P Axis 47 degrees    R Axis 83 degrees    T Axis 40 degrees       Imaging  No results found. Assessment and Plan  Patient is a 77 y.o. female who presented with chest pain. The active problem list is as follows:    · STEMI/CAD- S/p cardiac catheterization acute 100% occlusion of mid to distal LAD ( ? Embolic event due to PAF versus ruptured plaque ), status post primary PCI using two drug-eluting stents with persistence of no reflow despite vasodilator therapy. 50%-60% mid PDA stenosis. PAF during PCI. Aspirin for one week, Plavix, eliquis, statin. Toprol and entresto to be started depending on BP and HR. Echocardiogram. Cardiac rehab  · PAF- during PCI  · Hypertension  · Hyperlipidemia  · Diabetes mellitus type II  · Obesity class III- BMI 48  · Fibromyalgia      · Routine labs in the morning. · DVT prophylaxis.   · Please see orders for further management and care.        Bob Simon DO    7:44 AM  5/28/2022 show

## 2022-05-28 NOTE — CONSULTS
Reason for consult: Anterolateral STEMI.    HPI:   58-year-old morbidly obese non-smoker female who presented to the emergency room at [facility] last night due to chest pain. Patient was found to have ST elevation in lead I and aVL and in lead V2. She was given aspirin, 2 tablets of Brilinta, a bolus of heparin and was started on heparin drip prior to being transferred for emergency left heart catheterization. On arrival to the Cath Lab she was still in distress. She has history of diabetes, hyperlipidemia, hypertension, history of myocardial infarction, status post heart catheterization approximately 20 years ago, TIA, restless leg syndrome, arthritis, fibromyalgia, anxiety and depression. Past Medical History:   Diagnosis Date    Anxiety     Arthritis     Depression     [facility] mellitus ([facility]     Fibromyalgia     Hyperlipidemia     Hypertension     MI (myocardial infarction) ([facility] 05/28/2022    Restless leg syndrome     TIA (transient ischemic attack)        Past Surgical History:   Procedure Laterality Date    BREAST SURGERY      breast reduction    CARDIAC CATHETERIZATION      CHOLECYSTECTOMY      CORONARY ANGIOPLASTY WITH STENT PLACEMENT  05/28/2022    2.5 x 34mm Resolute Curtis to Mid LAD, and 2.5 x 30mm Resolute Curtis to Distal LAD. Dr. Simon Shelter knees       OP Meds:  Prior to Admission medications    Not on File        IP Meds:    aspirin  81 mg Oral Daily    clopidogrel  225 mg Oral Once    [START ON 5/29/2022] clopidogrel  75 mg Oral Daily    apixaban  5 mg Oral BID    atorvastatin  40 mg Oral Nightly       Allergies: Dilaudid [hydromorphone]    Social:       FH: family history is not on file.     Review of Systems:  HEENT: negative for acute visual and auditory problems  Constitutional: negative for fever and chills  Respiratory: negative for cough and hemoptysis  Cardiovascular: as per HPI  Gastrointestinal: negative for abdominal pain, diarrhea, nausea and vomiting  Genitourinary: negative for dysuria and hematuria  Derm: negative for rash and skin lesion(s)  Neurological: negative for seizures and tremors  Musculoskeletal: Musculoskeletal pain. Psychiatric: anxiety and major depression      Physical Exam: Post catheterization and PCI. Ht 5' 5\" (1.651 m)   Wt 278 lb (126.1 kg)   BMI 46.26 kg/m²    CONST: Middle-age morbidly obese female sitting on the stretcher in no acute distress. She is chest pain-free. HEENT: Head- normocephalic, atraumatic. Neck:  no jugular venous distention. No carotid bruit noted. LUNGS: Decreased air entry bilaterally with no wheezing or crackles. CARDIOVASCULAR:  RRR, no murmur, s3, s4 or rub noted. PV: No lower extremity edema. Pedal pulses palpable, no clubbing or cyanosis   ABDOMEN: Morbidly obese, soft, non-tender to light palpation. Bowel sounds present. No abdominal bruit. SKIN: Warm and dry no statis dermatitis or ulcers. NEURO / PSYCH: Oriented to person, place and time. Speech clear and appropriate. Follows all commands. Pleasant affect. Labs:        Emergency left heart catheterization: Done via the right radial approach:  Left main: No angiographic stenosis  LAD: Calcified is proximal to mid segment with 50% discrete eccentric mid stenosis 100% mid to distal LAD stenosis with MARIA LUZ 0 flow distally. Status post unsuccessful PCI using 2 drug-eluting stents to mid to distal LAD with persistence of no reflow. Left circumflex: No angiographic stenosis. RCA: 30-40% discrete mid RCA stenosis and 40-50% proximal discrete PDA stenosis and 50 to 60% discrete mid PDA stenosis. LVEDP 43 mmHg. No left echogram was done. PAF during the procedure. Assessment:   -Acute anterior STEMI: Status post primary PCI to mid and distal LAD using 2 drug-eluting stents with persistent no reflow.   -PAF during catheterization and history of irregular heartbeat as per patient.  -Hypertension: Controlled  -Hyperlipidemia. -Diabetes.  -History of TIA. -Restless leg syndrome.  -Fibromyalgia.  -Arthritis. -Morbid obesity.  -Anxiety and depression. Plan:  -Admit to telemetry floor.  -Loaded with Plavix in a.m. then 1 tablet daily  -Start Eliquis 5 mg twice daily.  -Continue coated aspirin 81 mg daily for 1 week. -Lasix 20 mg IV given in the Cath Lab at the end of the PCI. -Echocardiogram this morning to assess the patient ejection fraction and rule out valvular heart disease.  -Start atorvastatin 40 mg daily.  -Beta-blocker and Entresto to be started in a.m. depending on the patient heart rate and blood pressure. Thank you for the consult. Will continue to follow with you.

## 2022-05-28 NOTE — PATIENT CARE CONFERENCE
P Quality Flow/Interdisciplinary Rounds Progress Note        Quality Flow Rounds held on May 28, 2022    Disciplines Attending:  Bedside Nurse, ,  and Nursing Unit Leadership    Shanae Cronin was admitted on 5/28/2022  5:25 AM    Anticipated Discharge Date:       Disposition:    Willian Score:  Willian Scale Score: 20    Readmission Risk              Risk of Unplanned Readmission:  0           Discussed patient goal for the day, patient clinical progression, and barriers to discharge.   The following Goal(s) of the Day/Commitment(s) have been identified:  Diagnostics - Report Results      Estrella Clark RN  May 28, 2022

## 2022-05-28 NOTE — PROGRESS NOTES
Patient is requesting to MD someone in regarding DNR status, Dr. Jocelin Shrestha notified via perfect serve.

## 2022-05-28 NOTE — LETTER
[place] Department Medicaid  CERTIFICATION OF NECESSITY  FOR NON-EMERGENCY TRANSPORTATION   BY GROUND AMBULANCE      Individual Information   1. Name: Wenyd Delgadillo 2. [place] Medicaid Billing Number:    3. Address: [address]      Transportation Provider Information   4. Provider Name:    5. [place] Medicaid Provider Number:  National Provider Identifier (NPI):      Certification  7. Criteria:  During transport, this individual requires:  [x] Medical treatment or continuous     supervision by an EMT. [x] The administration or regulation of oxygen by another person. [] Supervised protective restraint. 8. Period Beginning Date: 6/1/22   9. Length  [x] Not more than 6 day(s)  [] One Year     Additional Information Relevant to Certification   10. Comments or Explanations, If Necessary or Appropriate     STEMI, 2L 02, Pt Exhibits Decreased Strength, Balance, Coordination Impairing Functional Ability. Certifying Practitioner Information   11. Name of Practitioner: Dr. Luis De Paz MD   12. [place] Medicaid Provider Number, If Applicable:  [address] Provider Identifier (NPI):      Signature Information   14. Date of Signature: 6/1/22 15. Name of Person Signing: Electronically signed by Latasha Rahman on 6/1/2022 at 2:58 PM     16. Signature and Professional Designation: Electronically signed by Leni Munoz Discharge Planner on 6/1/2022 at 2:58 PM     OD 11791  Rev. 7/2015      [address] [place] [address] Encounter Date/Time: 5/28/2022 [address] Account: [de-identified]    MRN: [id number]    Patient: Wendy Delgadillo    Contact Serial #: [id number]      ENCOUNTER          Patient Class: I Private Enc? No Unit RM BD: SEYZ [id number]   Hospital Service:  INM   Encounter DX: STEMI (ST elevation myoc*   ADM Provider: Sole Robert DO   Procedure:     ATT Provider: Luis De Paz MD   REF Provider: Porsha Quinn      Admission DX: STEMI (ST elevation myocardial infarction) ([facility] and DX codes: I21.3      PATIENT                 Name: Camelia Stein : 1955 (66 yrs)   Address: [address] [place] [address] Sex: Female   City: [facility] [address]         Marital Status:    Employer: [employer]         Denominational: Latter-day   Primary Care Provider:           Primary Phone: [phone]   EMERGENCY CONTACT   Contact Name Legal Guardian? Relationship to Patient Home Phone Work Phone   1. Romana Eason  2. Juanjose El      Other  Brother/Sister [phone]                 GUARANTOR            Guarantor: Camelia Stein     : 1955   Address: [place] [address] Sex: Female   [place] [address]     Relation to Patient: Self       Home Phone: [phone]   Guarantor ID: [id number]       Work Phone:     Guarantor Employer: [employer]         Status: UNKNOWN      COVERAGE  PRIMARY INSURANCE   Payor: [facility]IC[facility]   Payor Address: [facility] [id number] [place] [address]       Group Number: [id number] Type: INDEMNITY   Subscriber Name: Anahy Bragg : 1955   Subscriber ID: [id number] Pat. Rel. to Sub: Self   SECONDARY INSURANCE   Payor:   Plan:     Payor Address:  ,           Group Number:   Insurance Type:     Subscriber Name:   Subscriber :     Subscriber ID:   Pat.  Rel. to Sub:             CSN: [id number]

## 2022-05-28 NOTE — ACP (ADVANCE CARE PLANNING)
Advance Care Planning     Advance Care Planning Activator (Inpatient)  Conversation Note      Date of ACP Conversation: 5/28/2022   Conversation Conducted with: Patient with Decision Making Capacity  ACP Activator: [facility] diagnoses warranting ACP:  Principal Problem:    STEMI (ST elevation myocardial infarction) ([address]  Resolved Problems:    * No resolved hospital problems. *        Health Care Decision Maker:   Current Designated Health Care Decision Maker:         Care Preferences    Ventilation: \"If you were in your present state of health and suddenly became very ill and were unable to breathe on your own, what would your preference be about the use of a ventilator (breathing machine) if it were available to you? \"      Would the patient desire the use of ventilator (breathing machine)?: no    \"If your health worsens and it becomes clear that your chance of recovery is unlikely, what would your preference be about the use of a ventilator (breathing machine) if it were available to you? \"     Would the patient desire the use of ventilator (breathing machine)?: No      Resuscitation  \"CPR works best to restart the heart when there is a sudden event, like a heart attack, in someone who is otherwise healthy. Unfortunately, CPR does not typically restart the heart for people who have serious health conditions or who are very sick. \"    \"In the event your heart stopped as a result of an underlying serious health condition, would you want attempts to be made to restart your heart (answer \"yes\" for attempt to resuscitate) or would you prefer a natural death (answer \"no\" for do not attempt to resuscitate)? \" yes       [x] Yes   [] No   Educated Patient / Mel Zhou regarding differences between Advance Directives and portable DNR orders.         Conversation Outcomes:  [x] ACP discussion completed  [] Existing advance directive reviewed with patient; no changes to patient's previously recorded wishes  [] New Advance Directive completed  [] Portable Do Not Rescitate prepared for Provider review and signature  [] POLST/POST/MOLST/MOST prepared for Provider review and signature    Details of ACP discussion:  Patient states limited code. YES CPR. YES emergency meds. NO ventilator. NO defib.    Length of ACP Conversation in minutes: 17 mins  Code status following completion of discussion: Limited     Follow-up plan:    [x] Schedule follow-up conversation to continue planning  [] Referred individual to Provider for additional questions/concerns   [] Advised patient/agent/surrogate to review completed ACP document and update if needed with changes in condition, patient preferences or care setting  [] This note routed to one or more involved healthcare providers  [] None    Electronically signed by David East DO on 5/28/2022 at 3:14 PM

## 2022-05-28 NOTE — LETTER
[address] Medicaid  CERTIFICATION OF NECESSITY  FOR TRANSPORTATION   BY WHEELCHAIR VAN     Individual Information   1. Name: Angi Aguilera 2. [place] Medicaid Billing Number:   3. Address: [address]                                    Transport to [facility] [employer]   4. Provider Name: FEDERICO   5. [place] Medicaid Provider Number: National Provider Identifier (NPI):     Certification  7. Criteria:  By signing this document, the practitioner certifies that two statements are true:  A. This individual must be accompanied by a mobility-related assistive device from the point of pick-up to the point of drop-off. B. Transport of this individual by standard passenger vehicle or common carrier is precluded or contraindicated. 8. Period Beginning Date: 06/06/2022   9. Length  [x] Not more than 1 day(s)  [] One Year     Additional Information Relevant to Certification   10. Comments or Explanations, If Necessary or Appropriate     STEMI, 2 liters nasal cannula, stand-by assist for transfers     Certifying Practitioner Information   11. Name of Practitioner: Manjinder Freeman MD   12. [place] Medicaid Provider Number, If Applicable: [address] Provider Identifier (NPI):      Signature Information   14. Date of Signature: 06/06/2022 15. Name of Person Signing: ELISEO Marie, RN, CM/Discharge Planner   16. Signature and Professional Designation: Electronically signed by Neptali Hall RN on 6/6/2022 at 10:17 AM      OD 53390  Rev. 7/2015    [address] [facility] Encounter Date/Time: 5/28/2022 Austyn Wren [address] Account: [de-identified]    MRN: [id number]    Patient: Angi Aguilera    Contact Serial #: [id number]      ENCOUNTER          Patient Class: I Private Enc?   No Unit RM BD: [id number]   Hospital Service: MED   Encounter DX: STEMI (ST elevation myoc*   ADM Provider: Grady Carbajal DO   Procedure:     ATT Provider: Manjinder Freeman MD   REF Provider: Miriam Brooks      Admission DX: [facility] (ST elevation [facility]) [facility] and DX codes: I21.3      PATIENT                 Name: Melchor Daigle : 1955 (66 yrs)   Address: [address] [place] [address] Sex: Female   City: [facility] [address]         Marital Status:    Employer: [employer]         [employer]: [employer]   Primary Care Provider:           Primary Phone: [phone]   EMERGENCY CONTACT   Contact Name Legal Guardian? Relationship to Patient Home Phone Work Phone   1. Romana Eason  2. Stefano Llamas      Other  Brother/Sister [phone]                 GUARANTOR            Guarantor: Melchor Daigle     : 1955   Address: [place] [address] Sex: Female   [facility] [address]     Relation to Patient: Self       Home Phone: [phone]   Guarantor ID: [id number]       Work Phone:     Guarantor Employer: [employer]         Status: UNKNOWN      COVERAGE        PRIMARY INSURANCE   Payor: [facility] MEDICARE Plan: [facility]   Payor Address: [facility] [id number] [facility] [address]       Group Number: [id number] Type: INDEMNITY   Subscriber Name: Doris Patient : 1955   Subscriber ID: [id number] Pat. Rel. to Sub: Self   SECONDARY INSURANCE   Payor:   Plan:     Payor Address:  ,           Group Number:   Insurance Type:     Subscriber Name:   Subscriber :     Subscriber ID:   Pat.  Rel. to Sub:             CSN: [id number]

## 2022-05-29 ENCOUNTER — APPOINTMENT (OUTPATIENT)
Dept: GENERAL RADIOLOGY | Age: 67
DRG: 246 | End: 2022-05-29
Attending: STUDENT IN AN ORGANIZED HEALTH CARE EDUCATION/TRAINING PROGRAM
Payer: MEDICARE

## 2022-05-29 ENCOUNTER — APPOINTMENT (OUTPATIENT)
Dept: ULTRASOUND IMAGING | Age: 67
DRG: 246 | End: 2022-05-29
Attending: STUDENT IN AN ORGANIZED HEALTH CARE EDUCATION/TRAINING PROGRAM
Payer: MEDICARE

## 2022-05-29 LAB
ALBUMIN SERPL-MCNC: 4.1 G/DL (ref 3.5–5.2)
ALP BLD-CCNC: 122 U/L (ref 35–104)
ALT SERPL-CCNC: 56 U/L (ref 0–32)
ANION GAP SERPL CALCULATED.3IONS-SCNC: 16 MMOL/L (ref 7–16)
AST SERPL-CCNC: 200 U/L (ref 0–31)
BILIRUB SERPL-MCNC: 0.7 MG/DL (ref 0–1.2)
BUN BLDV-MCNC: 15 MG/DL (ref 6–23)
C-REACTIVE PROTEIN: 4 MG/DL (ref 0–0.4)
CALCIUM SERPL-MCNC: 9.4 MG/DL (ref 8.6–10.2)
CHLORIDE BLD-SCNC: 103 MMOL/L (ref 98–107)
CHOLESTEROL, TOTAL: 154 MG/DL (ref 0–199)
CO2: 20 MMOL/L (ref 22–29)
CREAT SERPL-MCNC: 1 MG/DL (ref 0.5–1)
GFR AFRICAN AMERICAN: >60
GFR NON-AFRICAN AMERICAN: 55 ML/MIN/1.73
GLUCOSE BLD-MCNC: 146 MG/DL (ref 74–99)
HBA1C MFR BLD: 6.1 % (ref 4–5.6)
HCT VFR BLD CALC: 36.5 % (ref 34–48)
HDLC SERPL-MCNC: 54 MG/DL
HEMOGLOBIN: 10.9 G/DL (ref 11.5–15.5)
LDL CHOLESTEROL CALCULATED: 77 MG/DL (ref 0–99)
LV EF: 30 %
LVEF MODALITY: NORMAL
MAGNESIUM: 2.1 MG/DL (ref 1.6–2.6)
MCH RBC QN AUTO: 25.6 PG (ref 26–35)
MCHC RBC AUTO-ENTMCNC: 29.9 % (ref 32–34.5)
MCV RBC AUTO: 85.7 FL (ref 80–99.9)
PDW BLD-RTO: 15.7 FL (ref 11.5–15)
PHOSPHORUS: 3.2 MG/DL (ref 2.5–4.5)
PLATELET # BLD: 266 E9/L (ref 130–450)
PMV BLD AUTO: 9.9 FL (ref 7–12)
POTASSIUM SERPL-SCNC: 4.6 MMOL/L (ref 3.5–5)
PRO-BNP: ABNORMAL PG/ML (ref 0–125)
RBC # BLD: 4.26 E12/L (ref 3.5–5.5)
REASON FOR REJECTION: NORMAL
REJECTED TEST: NORMAL
SEDIMENTATION RATE, ERYTHROCYTE: 27 MM/HR (ref 0–20)
SODIUM BLD-SCNC: 139 MMOL/L (ref 132–146)
TOTAL PROTEIN: 6.8 G/DL (ref 6.4–8.3)
TRIGL SERPL-MCNC: 116 MG/DL (ref 0–149)
TSH SERPL DL<=0.05 MIU/L-ACNC: 1.51 UIU/ML (ref 0.27–4.2)
VLDLC SERPL CALC-MCNC: 23 MG/DL
WBC # BLD: 11.1 E9/L (ref 4.5–11.5)

## 2022-05-29 PROCEDURE — 2580000003 HC RX 258: Performed by: INTERNAL MEDICINE

## 2022-05-29 PROCEDURE — 6360000002 HC RX W HCPCS: Performed by: INTERNAL MEDICINE

## 2022-05-29 PROCEDURE — 83735 ASSAY OF MAGNESIUM: CPT

## 2022-05-29 PROCEDURE — 6370000000 HC RX 637 (ALT 250 FOR IP): Performed by: INTERNAL MEDICINE

## 2022-05-29 PROCEDURE — G0378 HOSPITAL OBSERVATION PER HR: HCPCS | Performed by: INTERNAL MEDICINE

## 2022-05-29 PROCEDURE — 96376 TX/PRO/DX INJ SAME DRUG ADON: CPT

## 2022-05-29 PROCEDURE — 80074 ACUTE HEPATITIS PANEL: CPT

## 2022-05-29 PROCEDURE — 99226 PR SBSQ OBSERVATION CARE/DAY 35 MINUTES: CPT | Performed by: INTERNAL MEDICINE

## 2022-05-29 PROCEDURE — 85651 RBC SED RATE NONAUTOMATED: CPT

## 2022-05-29 PROCEDURE — G0378 HOSPITAL OBSERVATION PER HR: HCPCS

## 2022-05-29 PROCEDURE — 86703 HIV-1/HIV-2 1 RESULT ANTBDY: CPT

## 2022-05-29 PROCEDURE — 36415 COLL VENOUS BLD VENIPUNCTURE: CPT

## 2022-05-29 PROCEDURE — 93306 TTE W/DOPPLER COMPLETE: CPT

## 2022-05-29 PROCEDURE — 84443 ASSAY THYROID STIM HORMONE: CPT

## 2022-05-29 PROCEDURE — 83036 HEMOGLOBIN GLYCOSYLATED A1C: CPT

## 2022-05-29 PROCEDURE — 71045 X-RAY EXAM CHEST 1 VIEW: CPT

## 2022-05-29 PROCEDURE — 6360000004 HC RX CONTRAST MEDICATION

## 2022-05-29 PROCEDURE — 85027 COMPLETE CBC AUTOMATED: CPT

## 2022-05-29 PROCEDURE — 80053 COMPREHEN METABOLIC PANEL: CPT

## 2022-05-29 PROCEDURE — 83880 ASSAY OF NATRIURETIC PEPTIDE: CPT

## 2022-05-29 PROCEDURE — 2700000000 HC OXYGEN THERAPY PER DAY

## 2022-05-29 PROCEDURE — 76705 ECHO EXAM OF ABDOMEN: CPT

## 2022-05-29 PROCEDURE — 84100 ASSAY OF PHOSPHORUS: CPT

## 2022-05-29 PROCEDURE — 93005 ELECTROCARDIOGRAM TRACING: CPT | Performed by: INTERNAL MEDICINE

## 2022-05-29 PROCEDURE — 96375 TX/PRO/DX INJ NEW DRUG ADDON: CPT

## 2022-05-29 PROCEDURE — 96374 THER/PROPH/DIAG INJ IV PUSH: CPT

## 2022-05-29 PROCEDURE — 86140 C-REACTIVE PROTEIN: CPT

## 2022-05-29 PROCEDURE — 80061 LIPID PANEL: CPT

## 2022-05-29 RX ORDER — ZOLPIDEM TARTRATE 5 MG/1
5 TABLET ORAL NIGHTLY PRN
Status: DISCONTINUED | OUTPATIENT
Start: 2022-05-29 | End: 2022-05-30

## 2022-05-29 RX ORDER — FUROSEMIDE 10 MG/ML
40 INJECTION INTRAMUSCULAR; INTRAVENOUS 2 TIMES DAILY
Status: DISPENSED | OUTPATIENT
Start: 2022-05-29 | End: 2022-06-04

## 2022-05-29 RX ORDER — METOPROLOL SUCCINATE 25 MG/1
25 TABLET, EXTENDED RELEASE ORAL 2 TIMES DAILY
Status: DISCONTINUED | OUTPATIENT
Start: 2022-05-29 | End: 2022-05-30

## 2022-05-29 RX ORDER — ZOLPIDEM TARTRATE 5 MG/1
5 TABLET ORAL NIGHTLY
Status: DISCONTINUED | OUTPATIENT
Start: 2022-05-29 | End: 2022-05-29

## 2022-05-29 RX ORDER — PREGABALIN 100 MG/1
100 CAPSULE ORAL 3 TIMES DAILY
Status: DISCONTINUED | OUTPATIENT
Start: 2022-05-29 | End: 2022-06-06 | Stop reason: HOSPADM

## 2022-05-29 RX ORDER — LORAZEPAM 0.5 MG/1
0.5 TABLET ORAL EVERY 8 HOURS PRN
Status: DISCONTINUED | OUTPATIENT
Start: 2022-05-29 | End: 2022-06-06 | Stop reason: HOSPADM

## 2022-05-29 RX ORDER — FENTANYL CITRATE 50 UG/ML
50 INJECTION, SOLUTION INTRAMUSCULAR; INTRAVENOUS ONCE
Status: COMPLETED | OUTPATIENT
Start: 2022-05-29 | End: 2022-05-29

## 2022-05-29 RX ORDER — HYDROCODONE BITARTRATE AND ACETAMINOPHEN 5; 325 MG/1; MG/1
1 TABLET ORAL EVERY 6 HOURS PRN
Status: DISCONTINUED | OUTPATIENT
Start: 2022-05-29 | End: 2022-06-06 | Stop reason: HOSPADM

## 2022-05-29 RX ORDER — FENTANYL CITRATE 50 UG/ML
50 INJECTION, SOLUTION INTRAMUSCULAR; INTRAVENOUS
Status: DISCONTINUED | OUTPATIENT
Start: 2022-05-29 | End: 2022-06-06 | Stop reason: HOSPADM

## 2022-05-29 RX ORDER — COLCHICINE 0.6 MG/1
0.6 TABLET ORAL 2 TIMES DAILY
Status: DISCONTINUED | OUTPATIENT
Start: 2022-05-29 | End: 2022-06-03

## 2022-05-29 RX ORDER — COLCHICINE 0.6 MG/1
0.6 TABLET ORAL DAILY
Status: DISCONTINUED | OUTPATIENT
Start: 2022-05-29 | End: 2022-05-29

## 2022-05-29 RX ADMIN — SACUBITRIL AND VALSARTAN 1 TABLET: 24; 26 TABLET, FILM COATED ORAL at 10:11

## 2022-05-29 RX ADMIN — ROPINIROLE HYDROCHLORIDE 2 MG: 2 TABLET, FILM COATED ORAL at 23:07

## 2022-05-29 RX ADMIN — PREGABALIN 100 MG: 100 CAPSULE ORAL at 18:07

## 2022-05-29 RX ADMIN — ZOLPIDEM TARTRATE 5 MG: 5 TABLET ORAL at 23:43

## 2022-05-29 RX ADMIN — ISOSORBIDE MONONITRATE 30 MG: 30 TABLET, EXTENDED RELEASE ORAL at 09:53

## 2022-05-29 RX ADMIN — METOPROLOL SUCCINATE 25 MG: 25 TABLET, EXTENDED RELEASE ORAL at 10:11

## 2022-05-29 RX ADMIN — FENTANYL CITRATE 50 MCG: 0.05 INJECTION, SOLUTION INTRAMUSCULAR; INTRAVENOUS at 09:48

## 2022-05-29 RX ADMIN — HYDROCODONE BITARTRATE AND ACETAMINOPHEN 1 TABLET: 5; 325 TABLET ORAL at 02:29

## 2022-05-29 RX ADMIN — FENTANYL CITRATE 50 MCG: 0.05 INJECTION, SOLUTION INTRAMUSCULAR; INTRAVENOUS at 12:55

## 2022-05-29 RX ADMIN — FENTANYL CITRATE 50 MCG: 0.05 INJECTION, SOLUTION INTRAMUSCULAR; INTRAVENOUS at 15:06

## 2022-05-29 RX ADMIN — PREGABALIN 100 MG: 100 CAPSULE ORAL at 23:06

## 2022-05-29 RX ADMIN — HYDROCODONE BITARTRATE AND ACETAMINOPHEN 1 TABLET: 5; 325 TABLET ORAL at 23:37

## 2022-05-29 RX ADMIN — PANTOPRAZOLE SODIUM 40 MG: 40 TABLET, DELAYED RELEASE ORAL at 06:48

## 2022-05-29 RX ADMIN — APIXABAN 5 MG: 5 TABLET, FILM COATED ORAL at 09:31

## 2022-05-29 RX ADMIN — NITROGLYCERIN 0.4 MG: 0.4 TABLET, ORALLY DISINTEGRATING SUBLINGUAL at 07:11

## 2022-05-29 RX ADMIN — COLCHICINE 0.6 MG: 0.6 TABLET ORAL at 23:07

## 2022-05-29 RX ADMIN — COLCHICINE 0.6 MG: 0.6 TABLET ORAL at 12:32

## 2022-05-29 RX ADMIN — ACETAMINOPHEN 650 MG: 325 TABLET ORAL at 06:48

## 2022-05-29 RX ADMIN — CLOPIDOGREL BISULFATE 75 MG: 75 TABLET ORAL at 09:53

## 2022-05-29 RX ADMIN — LORAZEPAM 0.5 MG: 0.5 TABLET ORAL at 17:53

## 2022-05-29 RX ADMIN — FUROSEMIDE 40 MG: 10 INJECTION, SOLUTION INTRAMUSCULAR; INTRAVENOUS at 18:08

## 2022-05-29 RX ADMIN — NITROGLYCERIN 0.4 MG: 0.4 TABLET, ORALLY DISINTEGRATING SUBLINGUAL at 07:06

## 2022-05-29 RX ADMIN — METOPROLOL SUCCINATE 25 MG: 25 TABLET, EXTENDED RELEASE ORAL at 23:07

## 2022-05-29 RX ADMIN — LORAZEPAM 0.5 MG: 0.5 TABLET ORAL at 09:53

## 2022-05-29 RX ADMIN — SODIUM CHLORIDE, PRESERVATIVE FREE 10 ML: 5 INJECTION INTRAVENOUS at 23:08

## 2022-05-29 RX ADMIN — FENTANYL CITRATE 50 MCG: 50 INJECTION, SOLUTION INTRAMUSCULAR; INTRAVENOUS at 07:39

## 2022-05-29 RX ADMIN — FENTANYL CITRATE 50 MCG: 0.05 INJECTION, SOLUTION INTRAMUSCULAR; INTRAVENOUS at 17:50

## 2022-05-29 RX ADMIN — APIXABAN 5 MG: 5 TABLET, FILM COATED ORAL at 23:06

## 2022-05-29 RX ADMIN — SACUBITRIL AND VALSARTAN 1 TABLET: 24; 26 TABLET, FILM COATED ORAL at 23:07

## 2022-05-29 RX ADMIN — SODIUM CHLORIDE, PRESERVATIVE FREE 10 ML: 5 INJECTION INTRAVENOUS at 10:00

## 2022-05-29 ASSESSMENT — PAIN - FUNCTIONAL ASSESSMENT
PAIN_FUNCTIONAL_ASSESSMENT: ACTIVITIES ARE NOT PREVENTED
PAIN_FUNCTIONAL_ASSESSMENT: PREVENTS OR INTERFERES SOME ACTIVE ACTIVITIES AND ADLS

## 2022-05-29 ASSESSMENT — PAIN SCALES - GENERAL
PAINLEVEL_OUTOF10: 3
PAINLEVEL_OUTOF10: 9
PAINLEVEL_OUTOF10: 6
PAINLEVEL_OUTOF10: 8
PAINLEVEL_OUTOF10: 3
PAINLEVEL_OUTOF10: 8
PAINLEVEL_OUTOF10: 9
PAINLEVEL_OUTOF10: 8
PAINLEVEL_OUTOF10: 10
PAINLEVEL_OUTOF10: 1
PAINLEVEL_OUTOF10: 2
PAINLEVEL_OUTOF10: 7
PAINLEVEL_OUTOF10: 8
PAINLEVEL_OUTOF10: 7
PAINLEVEL_OUTOF10: 2
PAINLEVEL_OUTOF10: 8
PAINLEVEL_OUTOF10: 6

## 2022-05-29 ASSESSMENT — PAIN DESCRIPTION - ORIENTATION
ORIENTATION: MID
ORIENTATION: MID
ORIENTATION: LOWER
ORIENTATION: MID
ORIENTATION: MID

## 2022-05-29 ASSESSMENT — PAIN DESCRIPTION - LOCATION
LOCATION: CHEST
LOCATION: CHEST
LOCATION: BACK;CHEST
LOCATION: CHEST
LOCATION: CHEST

## 2022-05-29 ASSESSMENT — PAIN DESCRIPTION - ONSET
ONSET: ON-GOING

## 2022-05-29 ASSESSMENT — PAIN DESCRIPTION - DESCRIPTORS
DESCRIPTORS: JABBING;DISCOMFORT
DESCRIPTORS: SHARP

## 2022-05-29 ASSESSMENT — PAIN DESCRIPTION - FREQUENCY
FREQUENCY: CONTINUOUS

## 2022-05-29 ASSESSMENT — PAIN DESCRIPTION - PAIN TYPE
TYPE: ACUTE PAIN
TYPE: ACUTE PAIN;CHRONIC PAIN
TYPE: ACUTE PAIN
TYPE: ACUTE PAIN

## 2022-05-29 NOTE — PROGRESS NOTES
Dr Quin Jacobs on the unit, he is informed of the pt having active cp. EKG being done, order received from Dr Quin Jacobs to give Fentanyl 50 mcg now to the pt. The order is being carried out.

## 2022-05-29 NOTE — PROGRESS NOTES
Pt stated that she is having chest pain rating as a 9 on a scale 1-10 . C/o SOB at rest oxygen was off reapplied and encouraged to take slow easy breaths. [employer] here to perform test. Medicated with Fentanyl 50 mcg IV. Encouraged to relax verbalized an understanding.

## 2022-05-29 NOTE — PROGRESS NOTES
Reason for follow up: Anterolateral STEMI, status post Ahmed PCI with 2 drug-eluting stents placed to mid to distal LAD with persistence of no reflow. Subjective: Patient has been complaining of constant chest pain worse with inspiration. She was chest pain-free after her PCI. Objective:    She is scared. Scheduled Meds:   fentanNYL  50 mcg IntraVENous Once    sodium chloride flush  5-40 mL IntraVENous 2 times per day    aspirin  81 mg Oral Daily    clopidogrel  75 mg Oral Daily    apixaban  5 mg Oral BID    atorvastatin  40 mg Oral Nightly    isosorbide mononitrate  30 mg Oral Daily    metoprolol succinate  25 mg Oral Daily    sacubitril-valsartan  1 tablet Oral BID    pantoprazole  40 mg Oral QAM AC    rOPINIRole  2 mg Oral Nightly       Continuous Infusions:   sodium chloride             Intake/Output Summary (Last 24 hours) at 5/29/2022 0738  Last data filed at 5/29/2022 0506  Gross per 24 hour   Intake 580 ml   Output 350 ml   Net 230 ml       Patient Vitals for the past 96 hrs (Last 3 readings):   Weight   05/28/22 0527 291 lb 7.2 oz (132.2 kg)   05/28/22 0308 278 lb (126.1 kg)          PE:   /68   Pulse 83   Temp 97.5 °F (36.4 °C) (Temporal)   Resp 20   Ht 5' 5\" (1.651 m)   Wt 291 lb 7.2 oz (132.2 kg)   SpO2 100%   BMI 48.50 kg/m²      CONST: Middle-age morbidly obese female laying in bed planing of chest pain. HEENT: Head- normocephalic, atraumatic. Neck:  no jugular venous distention. No carotid bruit noted. LUNGS: Decreased air entry bilaterally with no wheezing or crackles. CARDIOVASCULAR:  RRR with distant heart sounds, I could not appreciate murmur, s3, s4 or rub. PV: No lower extremity edema. Pedal pulses palpable. right radial pulse present bruising of the right forearm. ABDOMEN: Morbidly obese, soft, non-tender to light palpation. Bowel sounds present. No abdominal bruit. SKIN: Warm and dry no statis dermatitis or ulcers. NEURO / PSYCH: Oriented to person, place and time. Speech clear and appropriate. Follows all commands. She is anxious. Monitor: Sinus rhythm at 80 to 90 bpm with occasional PVCs and couplets. Lab Review       Recent Labs     05/29/22  0509   WBC 11.1   HGB 10.9*   HCT 36.5          Recent Labs     05/28/22  1709 05/29/22  0509    139   K 4.5 4.6    103   CO2 23 20*   PHOS  --  3.2   BUN 17 15   CREATININE 1.0 1.0       Recent Labs     05/29/22  0509   *   ALT 56*   ALKPHOS 122*       EKG done this morning revealed sinus rhythm at 85 bpm, diffuse low voltage, PVCs, Q-wave in lead V1 and V2 and artifacts. Assessment:  -Acute anterior STEMI due to plaque rupture versus embolic event due to PAF: Status post primary PCI to mid and distal LAD using 2 drug-eluting stents with persistent no reflow. Her chest pain could be due to pericarditis post myocardial infarction. -PAF during catheterization and history of irregular heartbeat as per patient.  -Hypertension: Controlled  -Hyperlipidemia. -Diabetes.  -History of TIA. -Restless leg syndrome.  -Fibromyalgia.  -Arthritis. -Morbid obesity.  -Anxiety and depression. Plan:  -Give prn fentanyl for chest pain.  -Check CRP and ESR. -Obtain an echocardiogram this morning to rule out pericardial effusion and assess ejection fraction.  -If elevated CRP and ESR and/or pericardial effusion, then start colchicine and Motrin and discontinue aspirin.  -Continue other cardiac medications. Electronically signed by Divine Boyer MD on 5/29/2022 at 7:38 AM  [facility].

## 2022-05-29 NOTE — PROGRESS NOTES
Hospitalist Progress Note      SYNOPSIS: Patient admitted on 2022 for STEMI (ST elevation myocardial infarction) [facility])  presented to [facility] ER with complaint of chest pain. In the ER she was found to have STEMI with ST elevations in lead I and aVL and lead V2. She was given aspirin Brilinta, heparin bolus, started on heparin drip and transferred emergently to [facility] for cardiac catheterization. She was found to have acute 100% occlusion of mid to distal LAD ( ? Embolic event due to PAF versus ruptured plaque ), status post primary PCI using two drug-eluting stents with persistence of no reflow despite vasodilator therapy. 50%-60% mid PDA stenosis. PAF during PCI. SUBJECTIVE:  Stable overnight. No other overnight issues reported. Patient seen and examined  Records reviewed. Still having CP  Very anxious      Temp (24hrs), Av.3 °F (36.3 °C), Min:96.6 °F (35.9 °C), Max:97.9 °F (36.6 °C)    DIET: ADULT DIET; Regular; Low Fat/Low Chol/High Fiber/2 gm Na  CODE: Limited    Intake/Output Summary (Last 24 hours) at 2022 0845  Last data filed at 2022 0506  Gross per 24 hour   Intake 580 ml   Output 350 ml   Net 230 ml       Review of Systems  All bolded are positive; please see HPI  General:  Fever, chills, diaphoresis, fatigue, malaise, night sweats, weight loss  Psychological:  Anxiety, disorientation, hallucinations. ENT:  Epistaxis, headaches, vertigo, visual changes. Cardiovascular:  Chest pain, irregular heartbeats, palpitations, paroxysmal nocturnal dyspnea. Respiratory:  Shortness of breath, coughing, sputum production, hemoptysis, wheezing, orthopnea.   Gastrointestinal:  Nausea, vomiting, diarrhea, heartburn, constipation, abdominal pain, hematemesis, hematochezia, melena, acholic stools  Genito-Urinary:  Dysuria, urgency, frequency, hematuria  Musculoskeletal:  Joint pain, joint stiffness, joint swelling, muscle pain  Neurology:  Headache, focal neurological deficits, weakness, numbness, paresthesia  Derm:  Rashes, ulcers, excoriations, bruising  Extremities:  Decreased ROM, peripheral edema, mottling      OBJECTIVE:    /75   Pulse 87   Temp 97.5 °F (36.4 °C) (Temporal)   Resp 20   Ht 5' 5\" (1.651 m)   Wt 291 lb 7.2 oz (132.2 kg)   SpO2 100%   BMI 48.50 kg/m²     General appearance:  awake, alert, and oriented to person, place, time, and purpose; appears stated age and cooperative; no apparent distress no labored breathing  HEENT:  Conjunctivae/corneas clear. Neck: Supple. No jugular venous distention. Respiratory: symmetrical; clear to auscultation bilaterally; no wheezes; no rhonchi; no rales  Cardiovascular: rhythm regular; rate controlled; no murmurs  Abdomen: Soft, nontender, nondistended  Extremities:  peripheral pulses present; no peripheral edema; no ulcers  Musculoskeletal: No clubbing, cyanosis, no bilateral lower extremity edema. Brisk capillary refill. Skin:  No rashes  on visible skin  Neurologic: awake, alert and following commands     ASSESSMENT and PLAN:    · STEMI/CAD- S/p cardiac catheterization acute 100% occlusion of mid to distal LAD ( ? Embolic event due to PAF versus ruptured plaque ), status post primary PCI using two drug-eluting stents with persistence of no reflow despite vasodilator therapy. 50%-60% mid PDA stenosis. PAF during PCI. Aspirin for one week, Plavix, eliquis, statin. Toprol and entresto to be started depending on BP and HR. Echocardiogram. Cardiac rehab. Likely needs placement. Await PTOT evals. Sed rate and CRP.    · PAF- during PCI  · Hypertension  · Hyperlipidemia  · Diabetes mellitus type II  · Elevated LFTs- hold statin  · Obesity class III- BMI 48  · Fibromyalgia      Medications:  REVIEWED DAILY    Infusion Medications    sodium chloride       Scheduled Medications    sodium chloride flush  5-40 mL IntraVENous 2 times per day    aspirin  81 mg Oral Daily    clopidogrel  75 mg Oral Daily    apixaban  5 mg Oral BID    atorvastatin  40 mg Oral Nightly    isosorbide mononitrate  30 mg Oral Daily    metoprolol succinate  25 mg Oral Daily    sacubitril-valsartan  1 tablet Oral BID    pantoprazole  40 mg Oral QAM AC    rOPINIRole  2 mg Oral Nightly     PRN Meds: sodium chloride flush, sodium chloride, acetaminophen, perflutren lipid microspheres, HYDROcodone 5 mg - acetaminophen, nitroGLYCERIN, hydrOXYzine    Labs:     Recent Labs     05/29/22  0509   WBC 11.1   HGB 10.9*   HCT 36.5          Recent Labs     05/28/22  1709 05/29/22  0509    139   K 4.5 4.6    103   CO2 23 20*   BUN 17 15   CREATININE 1.0 1.0   CALCIUM 9.0 9.4   PHOS  --  3.2       Recent Labs     05/28/22  1709 05/29/22  0509   PROT 6.7 6.8   ALKPHOS 116* 122*   ALT 55* 56*   * 200*   BILITOT 0.6 0.7       No results for input(s): INR in the last 72 hours. No results for input(s): Katie [place] in the last 72 hours. Chronic labs:    Lab Results   Component Value Date    CHOL 154 05/29/2022    TRIG 116 05/29/2022    HDL 54 05/29/2022    LDLCALC 77 05/29/2022    TSH 1.510 05/29/2022    INR 1.0 10/18/2016    LABA1C 6.1 (H) 05/29/2022       Radiology: REVIEWED DAILY    +++++++++++++++++++++++++++++++++++++++++++++++++  Keagan Estimable, DO   [employer] - [address] [place], [place]  +++++++++++++++++++++++++++++++++++++++++++++++++  NOTE: This report was transcribed using voice recognition software. Every effort was made to ensure accuracy; however, inadvertent computerized transcription errors may be present.

## 2022-05-29 NOTE — CONSULTS
Met with patient and discussed that their physician has ordered a referral to our outpatient Phase II Cardiac Rehabilitation program. Reviewed the benefits of cardiac rehabilitation based on their diagnosis and personal risk factors. Patient demonstrates mild interest in Cardiac Rehabilitation at this time. Cardiac Rehabilitation brochure provided to patient/family. The Cardiac Rehabilitation Program has been provided the patient's referral information and pertinent patient details and history. The patient may call [facility] at [phone] for additional information or questions. Contact information for [facility] and other choices close to the patient's residence have been provided in the discharge instructions so that the patient may call and schedule an appointment when cleared by their physician.  Thank you for the referral.

## 2022-05-30 LAB
ALBUMIN SERPL-MCNC: 3.4 G/DL (ref 3.5–5.2)
ALP BLD-CCNC: 101 U/L (ref 35–104)
ALT SERPL-CCNC: 36 U/L (ref 0–32)
ANION GAP SERPL CALCULATED.3IONS-SCNC: 16 MMOL/L (ref 7–16)
AST SERPL-CCNC: 75 U/L (ref 0–31)
BILIRUB SERPL-MCNC: 0.8 MG/DL (ref 0–1.2)
BUN BLDV-MCNC: 19 MG/DL (ref 6–23)
CALCIUM SERPL-MCNC: 8.8 MG/DL (ref 8.6–10.2)
CHLORIDE BLD-SCNC: 100 MMOL/L (ref 98–107)
CO2: 21 MMOL/L (ref 22–29)
CREAT SERPL-MCNC: 1 MG/DL (ref 0.5–1)
GFR AFRICAN AMERICAN: >60
GFR NON-AFRICAN AMERICAN: 55 ML/MIN/1.73
GLUCOSE BLD-MCNC: 156 MG/DL (ref 74–99)
HCT VFR BLD CALC: 32.3 % (ref 34–48)
HEMOGLOBIN: 9.8 G/DL (ref 11.5–15.5)
MCH RBC QN AUTO: 25.9 PG (ref 26–35)
MCHC RBC AUTO-ENTMCNC: 30.3 % (ref 32–34.5)
MCV RBC AUTO: 85.4 FL (ref 80–99.9)
PDW BLD-RTO: 15.3 FL (ref 11.5–15)
PLATELET # BLD: 205 E9/L (ref 130–450)
PMV BLD AUTO: 10.3 FL (ref 7–12)
POTASSIUM SERPL-SCNC: 4.2 MMOL/L (ref 3.5–5)
RBC # BLD: 3.78 E12/L (ref 3.5–5.5)
SODIUM BLD-SCNC: 137 MMOL/L (ref 132–146)
TOTAL PROTEIN: 6.3 G/DL (ref 6.4–8.3)
WBC # BLD: 12 E9/L (ref 4.5–11.5)

## 2022-05-30 PROCEDURE — 6370000000 HC RX 637 (ALT 250 FOR IP): Performed by: INTERNAL MEDICINE

## 2022-05-30 PROCEDURE — 36415 COLL VENOUS BLD VENIPUNCTURE: CPT

## 2022-05-30 PROCEDURE — 2580000003 HC RX 258: Performed by: INTERNAL MEDICINE

## 2022-05-30 PROCEDURE — 80053 COMPREHEN METABOLIC PANEL: CPT

## 2022-05-30 PROCEDURE — 2700000000 HC OXYGEN THERAPY PER DAY

## 2022-05-30 PROCEDURE — 96376 TX/PRO/DX INJ SAME DRUG ADON: CPT

## 2022-05-30 PROCEDURE — 99226 PR SBSQ OBSERVATION CARE/DAY 35 MINUTES: CPT | Performed by: INTERNAL MEDICINE

## 2022-05-30 PROCEDURE — 6360000002 HC RX W HCPCS: Performed by: INTERNAL MEDICINE

## 2022-05-30 PROCEDURE — 2140000000 HC CCU INTERMEDIATE R&B

## 2022-05-30 PROCEDURE — 85027 COMPLETE CBC AUTOMATED: CPT

## 2022-05-30 RX ORDER — ATORVASTATIN CALCIUM 40 MG/1
40 TABLET, FILM COATED ORAL NIGHTLY
Status: DISCONTINUED | OUTPATIENT
Start: 2022-05-30 | End: 2022-06-04

## 2022-05-30 RX ORDER — METOPROLOL SUCCINATE 50 MG/1
50 TABLET, EXTENDED RELEASE ORAL 2 TIMES DAILY
Status: DISCONTINUED | OUTPATIENT
Start: 2022-05-30 | End: 2022-06-05

## 2022-05-30 RX ORDER — SPIRONOLACTONE 25 MG/1
25 TABLET ORAL DAILY
Status: DISCONTINUED | OUTPATIENT
Start: 2022-05-30 | End: 2022-06-06 | Stop reason: HOSPADM

## 2022-05-30 RX ADMIN — PREGABALIN 100 MG: 100 CAPSULE ORAL at 21:49

## 2022-05-30 RX ADMIN — ISOSORBIDE MONONITRATE 30 MG: 30 TABLET, EXTENDED RELEASE ORAL at 08:45

## 2022-05-30 RX ADMIN — APIXABAN 5 MG: 5 TABLET, FILM COATED ORAL at 08:46

## 2022-05-30 RX ADMIN — FUROSEMIDE 40 MG: 10 INJECTION, SOLUTION INTRAMUSCULAR; INTRAVENOUS at 18:41

## 2022-05-30 RX ADMIN — SPIRONOLACTONE 25 MG: 25 TABLET ORAL at 14:51

## 2022-05-30 RX ADMIN — PREGABALIN 100 MG: 100 CAPSULE ORAL at 08:45

## 2022-05-30 RX ADMIN — PREGABALIN 100 MG: 100 CAPSULE ORAL at 14:51

## 2022-05-30 RX ADMIN — APIXABAN 5 MG: 5 TABLET, FILM COATED ORAL at 21:48

## 2022-05-30 RX ADMIN — COLCHICINE 0.6 MG: 0.6 TABLET ORAL at 08:46

## 2022-05-30 RX ADMIN — PANTOPRAZOLE SODIUM 40 MG: 40 TABLET, DELAYED RELEASE ORAL at 06:39

## 2022-05-30 RX ADMIN — SODIUM CHLORIDE, PRESERVATIVE FREE 10 ML: 5 INJECTION INTRAVENOUS at 21:50

## 2022-05-30 RX ADMIN — CLOPIDOGREL BISULFATE 75 MG: 75 TABLET ORAL at 08:46

## 2022-05-30 RX ADMIN — SACUBITRIL AND VALSARTAN 1 TABLET: 24; 26 TABLET, FILM COATED ORAL at 08:45

## 2022-05-30 RX ADMIN — SODIUM CHLORIDE, PRESERVATIVE FREE 10 ML: 5 INJECTION INTRAVENOUS at 08:46

## 2022-05-30 RX ADMIN — FUROSEMIDE 40 MG: 10 INJECTION, SOLUTION INTRAMUSCULAR; INTRAVENOUS at 08:45

## 2022-05-30 RX ADMIN — ATORVASTATIN CALCIUM 40 MG: 40 TABLET, FILM COATED ORAL at 21:49

## 2022-05-30 RX ADMIN — METOPROLOL SUCCINATE 50 MG: 50 TABLET, EXTENDED RELEASE ORAL at 21:49

## 2022-05-30 RX ADMIN — ROPINIROLE HYDROCHLORIDE 2 MG: 2 TABLET, FILM COATED ORAL at 21:50

## 2022-05-30 RX ADMIN — COLCHICINE 0.6 MG: 0.6 TABLET ORAL at 21:49

## 2022-05-30 RX ADMIN — METOPROLOL SUCCINATE 25 MG: 25 TABLET, EXTENDED RELEASE ORAL at 08:45

## 2022-05-30 RX ADMIN — SACUBITRIL AND VALSARTAN 1 TABLET: 24; 26 TABLET, FILM COATED ORAL at 21:50

## 2022-05-30 ASSESSMENT — PAIN SCALES - GENERAL
PAINLEVEL_OUTOF10: 0

## 2022-05-30 NOTE — PROGRESS NOTES
Reason for follow up: Anterolateral STEMI, status post primary PCI with 2 drug-eluting stents placed to mid to distal LAD with persistence of no reflow. Subjective: Denies chest discomfort but has been short of breath. Objective:    No distress. No events overnight. Scheduled Meds:   metoprolol succinate  50 mg Oral BID    pregabalin  100 mg Oral TID    furosemide  40 mg IntraVENous BID    colchicine  0.6 mg Oral BID    sodium chloride flush  5-40 mL IntraVENous 2 times per day    clopidogrel  75 mg Oral Daily    apixaban  5 mg Oral BID    [Held by provider] atorvastatin  40 mg Oral Nightly    isosorbide mononitrate  30 mg Oral Daily    sacubitril-valsartan  1 tablet Oral BID    pantoprazole  40 mg Oral QAM AC    rOPINIRole  2 mg Oral Nightly       Continuous Infusions:   sodium chloride             Intake/Output Summary (Last 24 hours) at 5/30/2022 1339  Last data filed at 5/30/2022 0682  Gross per 24 hour   Intake 50 ml   Output 150 ml   Net -100 ml       Patient Vitals for the past 96 hrs (Last 3 readings):   Weight   05/30/22 0600 279 lb 3.2 oz (126.6 kg)   05/28/22 0527 291 lb 7.2 oz (132.2 kg)   05/28/22 0308 278 lb (126.1 kg)          PE:   /84   Pulse 85   Temp 98.1 °F (36.7 °C) (Oral)   Resp 20   Ht 5' 5\" (1.651 m)   Wt 279 lb 3.2 oz (126.6 kg)   SpO2 100%   BMI 46.46 kg/m²   CONST: Middle-age morbidly obese female  sitting in bed in no acute distress  HEENT: Head- normocephalic, atraumatic. Neck:  no jugular venous distention. No carotid bruit noted. LUNGS: Decreased air entry bilaterally with no wheezing or crackles. CARDIOVASCULAR:  RRR with distant heart sounds, I could not appreciate murmur, s3, s4 or rub. PV: No lower extremity edema. Pedal pulses palpable. right radial pulse present bruising of the right forearm. ABDOMEN: Morbidly obese, soft, non-tender to light palpation. Bowel sounds present.  No abdominal bruit.   SKIN: Warm and dry no statis dermatitis or ulcers. NEURO / PSYCH: Oriented to person, place and time. Speech clear and appropriate. Follows all commands. She is anxious. Monitor: Sinus rhythm at 83 bpm.      Lab Review       Recent Labs     05/29/22  0509 05/30/22  0547   WBC 11.1 12.0*   HGB 10.9* 9.8*   HCT 36.5 32.3*    205       Recent Labs     05/28/22  1709 05/29/22  0509 05/30/22  0547    139 137   K 4.5 4.6 4.2    103 100   CO2 23 20* 21*   PHOS  --  3.2  --    BUN 17 15 19   CREATININE 1.0 1.0 1.0       Recent Labs     05/30/22  0547   AST 75*   ALT 36*   ALKPHOS 101           Recent Labs     05/29/22  0509   PROBNP 17,432*     Echocardiogram done on 5/29/2022:  Technically limited study due to patient body habitus. Definity study was done. Moderately dilated left atrium. Mild concentric left ventricular hypertrophy. Mild tricuspid regurgitation. Moderate pulmonary hypertension. Grade 2 diastolic dysfunction. Trivial anterior pericardial effusion. Ischemic cardiomyopathy with an EF 30+/-5%. Assessment:  -Acute anterior STEMI due to plaque rupture versus embolic event due to PAF: Status post primary PCI to mid and distal LAD using 2 drug-eluting stents with persistent no reflow. chest pain yesterday could be due to pericarditis post myocardial infarction. No chest pain today.  -Ischemic cardiomyopathy with severe systolic dysfunction and acute CHF.  -PAF during catheterization and history of irregular heartbeat as per patient.  -Hypertension: Controlled. -Hyperlipidemia. -Diabetes.  -History of TIA. -Restless leg syndrome.  -Fibromyalgia.  -Arthritis. -Morbid obesity.  -Anxiety and depression. Plan:  -Increase Toprol to 50 mg twice daily.  -Restart atorvastatin 40 mg daily.  -Continue diuresis with intravenous Lasix.  -Follow-up kidney function, electrolytes and proBNP. -Follow-up chest x-ray.   -Arrange for LifeVest prior to hospital discharge.  -Continue Plavix and Eliquis.  -Continue Entresto.  -Start Aldactone 25 mg daily.  -Cardiac rehab post hospital discharge.  -Sleep study as outpatient. Electronically signed by Fazal Flynn MD on 5/30/2022 at 1:39 PM  [facility].

## 2022-05-30 NOTE — PROGRESS NOTES
Up on rounding, pt found in chair, pt very drowsy, answers questions appropriately but unable to stand up with walker and this RN help. Pt assisted back to bed x 4 assists  Complete lift. Oxygen sat 100% 0n 3.5 L, will continue to monitor pt.

## 2022-05-30 NOTE — PROGRESS NOTES
Hospitalist Progress Note      SYNOPSIS: Patient admitted on 2022 for STEMI (ST elevation myocardial infarction) [facility])  presented to [facility] ER with complaint of chest pain. In the ER she was found to have STEMI with ST elevations in lead I and aVL and lead V2. She was given aspirin Brilinta, heparin bolus, started on heparin drip and transferred emergently to [facility] for cardiac catheterization. She was found to have acute 100% occlusion of mid to distal LAD ( ? Embolic event due to PAF versus ruptured plaque ), status post primary PCI using two drug-eluting stents with persistence of no reflow despite vasodilator therapy. 50%-60% mid PDA stenosis. PAF during PCI. Patient has persistent CP after procedure, started on colchicine. Patient will need placement      SUBJECTIVE:  Stable overnight. No other overnight issues reported. Patient seen and examined  Records reviewed. CP improving but present  Now on colchicine  Very weak, will need PT evals and placement      Temp (24hrs), Av °F (36.7 °C), Min:97.7 °F (36.5 °C), Max:98.1 °F (36.7 °C)    DIET: ADULT DIET; Regular; Low Fat/Low Chol/High Fiber/2 gm Na  CODE: Limited    Intake/Output Summary (Last 24 hours) at 2022 5408  Last data filed at 2022 8354  Gross per 24 hour   Intake 50 ml   Output 150 ml   Net -100 ml       Review of Systems  All bolded are positive; please see HPI  General:  Fever, chills, diaphoresis, fatigue, malaise, night sweats, weight loss  Psychological:  Anxiety, disorientation, hallucinations. ENT:  Epistaxis, headaches, vertigo, visual changes. Cardiovascular:  Chest pain, irregular heartbeats, palpitations, paroxysmal nocturnal dyspnea. Respiratory:  Shortness of breath, coughing, sputum production, hemoptysis, wheezing, orthopnea.   Gastrointestinal:  Nausea, vomiting, diarrhea, heartburn, constipation, abdominal pain, hematemesis, hematochezia, melena, acholic stools  Genito-Urinary: Dysuria, urgency, frequency, hematuria  Musculoskeletal:  Joint pain, joint stiffness, joint swelling, muscle pain  Neurology:  Headache, focal neurological deficits, weakness, numbness, paresthesia  Derm:  Rashes, ulcers, excoriations, bruising  Extremities:  Decreased ROM, peripheral edema, mottling      OBJECTIVE:    /85   Pulse 79   Temp 97.7 °F (36.5 °C) (Oral)   Resp 20   Ht 5' 5\" (1.651 m)   Wt 279 lb 3.2 oz (126.6 kg)   SpO2 100%   BMI 46.46 kg/m²     General appearance:  awake, alert, and oriented to person, place, time, and purpose; appears stated age and cooperative; no apparent distress no labored breathing 3L  HEENT:  Conjunctivae/corneas clear. Neck: Supple. No jugular venous distention. Respiratory: symmetrical; clear to auscultation bilaterally; no wheezes; no rhonchi; no rales  Cardiovascular: rhythm regular; rate controlled; no murmurs  Abdomen: Soft, nontender, nondistended  Extremities:  peripheral pulses present; no peripheral edema; no ulcers  Musculoskeletal: No clubbing, cyanosis, no bilateral lower extremity edema. Brisk capillary refill. Skin:  No rashes  on visible skin  Neurologic: awake, alert and following commands     ASSESSMENT and PLAN:    · STEMI/CAD- S/p cardiac catheterization acute 100% occlusion of mid to distal LAD ( ? Embolic event due to PAF versus ruptured plaque ), status post primary PCI using two drug-eluting stents with persistence of no reflow despite vasodilator therapy. 50%-60% mid PDA stenosis. PAF during PCI. Aspirin for one week, Plavix, eliquis, statin. Toprol and entresto to be started depending on BP and HR. Echocardiogram. Cardiac rehab. Likely needs placement. Await PTOT evals. Sed rate and CRP.    · Elevated proBNP- start lasix  · Elevated sed rate/CRP- started on colchicine  · PAF- during PCI  · Hypertension  · Hyperlipidemia  · Diabetes mellitus type II  · Elevated LFTs- hold statin  · Obesity class III- BMI 48  · Fibromyalgia    Awaiting PT evals, needs placement      Medications:  REVIEWED DAILY    Infusion Medications    sodium chloride       Scheduled Medications    pregabalin  100 mg Oral TID    furosemide  40 mg IntraVENous BID    colchicine  0.6 mg Oral BID    metoprolol succinate  25 mg Oral BID    sodium chloride flush  5-40 mL IntraVENous 2 times per day    clopidogrel  75 mg Oral Daily    apixaban  5 mg Oral BID    [Held by provider] atorvastatin  40 mg Oral Nightly    isosorbide mononitrate  30 mg Oral Daily    sacubitril-valsartan  1 tablet Oral BID    pantoprazole  40 mg Oral QAM AC    rOPINIRole  2 mg Oral Nightly     PRN Meds: fentanNYL, LORazepam, HYDROcodone 5 mg - acetaminophen, sodium chloride flush, sodium chloride, acetaminophen, perflutren lipid microspheres, nitroGLYCERIN, [Held by provider] hydrOXYzine    Labs:     Recent Labs     05/29/22  0509 05/30/22  0547   WBC 11.1 12.0*   HGB 10.9* 9.8*   HCT 36.5 32.3*    205       Recent Labs     05/28/22  1709 05/29/22  0509 05/30/22  0547    139 137   K 4.5 4.6 4.2    103 100   CO2 23 20* 21*   BUN 17 15 19   CREATININE 1.0 1.0 1.0   CALCIUM 9.0 9.4 8.8   PHOS  --  3.2  --        Recent Labs     05/28/22  1709 05/29/22  0509 05/30/22  0547   PROT 6.7 6.8 6.3*   ALKPHOS 116* 122* 101   ALT 55* 56* 36*   * 200* 75*   BILITOT 0.6 0.7 0.8       No results for input(s): INR in the last 72 hours. No results for input(s): Janis Taylor in the last 72 hours.     Chronic labs:    Lab Results   Component Value Date    CHOL 154 05/29/2022    TRIG 116 05/29/2022    HDL 54 05/29/2022    LDLCALC 77 05/29/2022    TSH 1.510 05/29/2022    INR 1.0 10/18/2016    LABA1C 6.1 (H) 05/29/2022       Radiology: REVIEWED DAILY    +++++++++++++++++++++++++++++++++++++++++++++++++  DO Fan Diehl Physician - [address] [facility], [facility]  +++++++++++++++++++++++++++++++++++++++++++++++++  NOTE: This report was transcribed using voice recognition software. Every effort was made to ensure accuracy; however, inadvertent computerized transcription errors may be present.

## 2022-05-31 ENCOUNTER — APPOINTMENT (OUTPATIENT)
Dept: GENERAL RADIOLOGY | Age: 67
DRG: 246 | End: 2022-05-31
Attending: STUDENT IN AN ORGANIZED HEALTH CARE EDUCATION/TRAINING PROGRAM
Payer: MEDICARE

## 2022-05-31 LAB
ALBUMIN SERPL-MCNC: 3.1 G/DL (ref 3.5–5.2)
ALP BLD-CCNC: 97 U/L (ref 35–104)
ALT SERPL-CCNC: 25 U/L (ref 0–32)
ANION GAP SERPL CALCULATED.3IONS-SCNC: 14 MMOL/L (ref 7–16)
AST SERPL-CCNC: 45 U/L (ref 0–31)
BILIRUB SERPL-MCNC: 0.6 MG/DL (ref 0–1.2)
BUN BLDV-MCNC: 21 MG/DL (ref 6–23)
CALCIUM SERPL-MCNC: 8.6 MG/DL (ref 8.6–10.2)
CHLORIDE BLD-SCNC: 100 MMOL/L (ref 98–107)
CO2: 23 MMOL/L (ref 22–29)
CREAT SERPL-MCNC: 1 MG/DL (ref 0.5–1)
EKG ATRIAL RATE: 65 BPM
EKG ATRIAL RATE: 72 BPM
EKG ATRIAL RATE: 83 BPM
EKG P AXIS: 47 DEGREES
EKG P AXIS: 53 DEGREES
EKG P AXIS: 75 DEGREES
EKG P-R INTERVAL: 182 MS
EKG P-R INTERVAL: 190 MS
EKG P-R INTERVAL: 190 MS
EKG Q-T INTERVAL: 348 MS
EKG Q-T INTERVAL: 406 MS
EKG Q-T INTERVAL: 432 MS
EKG QRS DURATION: 68 MS
EKG QRS DURATION: 84 MS
EKG QRS DURATION: 86 MS
EKG QTC CALCULATION (BAZETT): 361 MS
EKG QTC CALCULATION (BAZETT): 444 MS
EKG QTC CALCULATION (BAZETT): 514 MS
EKG R AXIS: 76 DEGREES
EKG R AXIS: 80 DEGREES
EKG R AXIS: 83 DEGREES
EKG T AXIS: 143 DEGREES
EKG T AXIS: 26 DEGREES
EKG T AXIS: 40 DEGREES
EKG VENTRICULAR RATE: 65 BPM
EKG VENTRICULAR RATE: 72 BPM
EKG VENTRICULAR RATE: 85 BPM
GFR AFRICAN AMERICAN: >60
GFR NON-AFRICAN AMERICAN: 55 ML/MIN/1.73
GLUCOSE BLD-MCNC: 132 MG/DL (ref 74–99)
HAV IGM SER IA-ACNC: NORMAL
HCT VFR BLD CALC: 32 % (ref 34–48)
HEMOGLOBIN: 9.8 G/DL (ref 11.5–15.5)
HEPATITIS B CORE IGM ANTIBODY: NORMAL
HEPATITIS B SURFACE ANTIGEN INTERPRETATION: NORMAL
HEPATITIS C ANTIBODY INTERPRETATION: NORMAL
HIV-1 AND HIV-2 ANTIBODIES: NORMAL
MCH RBC QN AUTO: 25.7 PG (ref 26–35)
MCHC RBC AUTO-ENTMCNC: 30.6 % (ref 32–34.5)
MCV RBC AUTO: 83.8 FL (ref 80–99.9)
PDW BLD-RTO: 15.7 FL (ref 11.5–15)
PLATELET # BLD: 230 E9/L (ref 130–450)
PMV BLD AUTO: 10.3 FL (ref 7–12)
POTASSIUM REFLEX MAGNESIUM: 4 MMOL/L (ref 3.5–5)
POTASSIUM SERPL-SCNC: 4 MMOL/L (ref 3.5–5)
PRO-BNP: ABNORMAL PG/ML (ref 0–125)
RBC # BLD: 3.82 E12/L (ref 3.5–5.5)
SODIUM BLD-SCNC: 137 MMOL/L (ref 132–146)
TOTAL PROTEIN: 6.3 G/DL (ref 6.4–8.3)
WBC # BLD: 9.8 E9/L (ref 4.5–11.5)

## 2022-05-31 PROCEDURE — 97530 THERAPEUTIC ACTIVITIES: CPT

## 2022-05-31 PROCEDURE — 80053 COMPREHEN METABOLIC PANEL: CPT

## 2022-05-31 PROCEDURE — 97165 OT EVAL LOW COMPLEX 30 MIN: CPT

## 2022-05-31 PROCEDURE — 80048 BASIC METABOLIC PNL TOTAL CA: CPT

## 2022-05-31 PROCEDURE — 85027 COMPLETE CBC AUTOMATED: CPT

## 2022-05-31 PROCEDURE — 83880 ASSAY OF NATRIURETIC PEPTIDE: CPT

## 2022-05-31 PROCEDURE — 6360000002 HC RX W HCPCS: Performed by: INTERNAL MEDICINE

## 2022-05-31 PROCEDURE — 6370000000 HC RX 637 (ALT 250 FOR IP): Performed by: INTERNAL MEDICINE

## 2022-05-31 PROCEDURE — 97535 SELF CARE MNGMENT TRAINING: CPT

## 2022-05-31 PROCEDURE — 2140000000 HC CCU INTERMEDIATE R&B

## 2022-05-31 PROCEDURE — 97161 PT EVAL LOW COMPLEX 20 MIN: CPT

## 2022-05-31 PROCEDURE — 36415 COLL VENOUS BLD VENIPUNCTURE: CPT

## 2022-05-31 PROCEDURE — 71045 X-RAY EXAM CHEST 1 VIEW: CPT

## 2022-05-31 PROCEDURE — 99232 SBSQ HOSP IP/OBS MODERATE 35: CPT | Performed by: INTERNAL MEDICINE

## 2022-05-31 PROCEDURE — 93005 ELECTROCARDIOGRAM TRACING: CPT | Performed by: INTERNAL MEDICINE

## 2022-05-31 PROCEDURE — 2700000000 HC OXYGEN THERAPY PER DAY

## 2022-05-31 PROCEDURE — 2580000003 HC RX 258: Performed by: INTERNAL MEDICINE

## 2022-05-31 RX ORDER — ZOLPIDEM TARTRATE 5 MG/1
5 TABLET ORAL ONCE
Status: COMPLETED | OUTPATIENT
Start: 2022-05-31 | End: 2022-05-31

## 2022-05-31 RX ORDER — ZOLPIDEM TARTRATE 5 MG/1
5 TABLET ORAL NIGHTLY PRN
Status: DISCONTINUED | OUTPATIENT
Start: 2022-06-01 | End: 2022-06-06 | Stop reason: HOSPADM

## 2022-05-31 RX ORDER — DIGOXIN 0.25 MG/ML
250 INJECTION INTRAMUSCULAR; INTRAVENOUS ONCE
Status: COMPLETED | OUTPATIENT
Start: 2022-05-31 | End: 2022-05-31

## 2022-05-31 RX ADMIN — DIGOXIN 250 MCG: 0.25 INJECTION INTRAMUSCULAR; INTRAVENOUS at 22:29

## 2022-05-31 RX ADMIN — FUROSEMIDE 40 MG: 10 INJECTION, SOLUTION INTRAMUSCULAR; INTRAVENOUS at 08:28

## 2022-05-31 RX ADMIN — SODIUM CHLORIDE, PRESERVATIVE FREE 10 ML: 5 INJECTION INTRAVENOUS at 08:29

## 2022-05-31 RX ADMIN — ZOLPIDEM TARTRATE 5 MG: 5 TABLET ORAL at 23:14

## 2022-05-31 RX ADMIN — ACETAMINOPHEN 650 MG: 325 TABLET ORAL at 21:38

## 2022-05-31 RX ADMIN — SODIUM CHLORIDE, PRESERVATIVE FREE 10 ML: 5 INJECTION INTRAVENOUS at 22:27

## 2022-05-31 RX ADMIN — APIXABAN 5 MG: 5 TABLET, FILM COATED ORAL at 08:26

## 2022-05-31 RX ADMIN — ISOSORBIDE MONONITRATE 30 MG: 30 TABLET, EXTENDED RELEASE ORAL at 12:19

## 2022-05-31 RX ADMIN — METOPROLOL SUCCINATE 50 MG: 50 TABLET, EXTENDED RELEASE ORAL at 08:28

## 2022-05-31 RX ADMIN — PANTOPRAZOLE SODIUM 40 MG: 40 TABLET, DELAYED RELEASE ORAL at 06:47

## 2022-05-31 RX ADMIN — APIXABAN 5 MG: 5 TABLET, FILM COATED ORAL at 21:39

## 2022-05-31 RX ADMIN — SACUBITRIL AND VALSARTAN 1 TABLET: 24; 26 TABLET, FILM COATED ORAL at 08:27

## 2022-05-31 RX ADMIN — CLOPIDOGREL BISULFATE 75 MG: 75 TABLET ORAL at 08:26

## 2022-05-31 RX ADMIN — COLCHICINE 0.6 MG: 0.6 TABLET ORAL at 08:26

## 2022-05-31 RX ADMIN — SODIUM CHLORIDE, PRESERVATIVE FREE 10 ML: 5 INJECTION INTRAVENOUS at 21:37

## 2022-05-31 RX ADMIN — PREGABALIN 100 MG: 100 CAPSULE ORAL at 16:25

## 2022-05-31 RX ADMIN — PREGABALIN 100 MG: 100 CAPSULE ORAL at 21:39

## 2022-05-31 RX ADMIN — ROPINIROLE HYDROCHLORIDE 2 MG: 2 TABLET, FILM COATED ORAL at 21:39

## 2022-05-31 RX ADMIN — PREGABALIN 100 MG: 100 CAPSULE ORAL at 08:27

## 2022-05-31 RX ADMIN — SPIRONOLACTONE 25 MG: 25 TABLET ORAL at 08:26

## 2022-05-31 RX ADMIN — ATORVASTATIN CALCIUM 40 MG: 40 TABLET, FILM COATED ORAL at 21:39

## 2022-05-31 RX ADMIN — FUROSEMIDE 40 MG: 10 INJECTION, SOLUTION INTRAMUSCULAR; INTRAVENOUS at 18:41

## 2022-05-31 RX ADMIN — ACETAMINOPHEN 650 MG: 325 TABLET ORAL at 14:17

## 2022-05-31 ASSESSMENT — PAIN DESCRIPTION - DESCRIPTORS
DESCRIPTORS: ACHING;BURNING;DULL
DESCRIPTORS: ACHING;DISCOMFORT;SHARP

## 2022-05-31 ASSESSMENT — PAIN SCALES - GENERAL
PAINLEVEL_OUTOF10: 0
PAINLEVEL_OUTOF10: 6
PAINLEVEL_OUTOF10: 0
PAINLEVEL_OUTOF10: 6
PAINLEVEL_OUTOF10: 0
PAINLEVEL_OUTOF10: 2

## 2022-05-31 ASSESSMENT — PAIN DESCRIPTION - LOCATION
LOCATION: BACK
LOCATION: WRIST

## 2022-05-31 ASSESSMENT — PAIN DESCRIPTION - ORIENTATION
ORIENTATION: LOWER
ORIENTATION: RIGHT

## 2022-05-31 ASSESSMENT — PAIN DESCRIPTION - ONSET: ONSET: GRADUAL

## 2022-05-31 ASSESSMENT — PAIN DESCRIPTION - FREQUENCY: FREQUENCY: CONTINUOUS

## 2022-05-31 ASSESSMENT — PAIN DESCRIPTION - PAIN TYPE: TYPE: ACUTE PAIN

## 2022-05-31 NOTE — CARE COORDINATION
5/31, SW met with patient at bedside to discuss transition of care/discharge plan and SW role. Patient was admitted for STEMI. Patient lives alone in a 2 story home with 3 sets of stairs to enter the home. DME owned is a cane. patient has had recent falls at home. Patient is currently on 2L 02 and does not wear any 02 at home. Discussed [facility] which patient is interested in going too. Gave patient a 6 click list and she chose 1-[facility] in [place] and 3-[place]. Referral made to [place] at [facility] [place] in [place]. Discussed patient having money issues with paying for new furnace along with medical and medication expenses. Patient says she does not qualify for Medicaid-referred her to [place]. Spoke about Pharmacy assistance information which patient was given along with 211 to contact for her county to see what is available to assist her with her out of pocket costs. Patient also has a [employer] order which Marielena Klein was contacted about by nursing. SW to follow for [facility] discharge planning. [place] [id number] [place] [facility] Case Management  [phone]    The Plan for Transition of Care is related to the following treatment goals: The Patient and/or patient representative  was provided with a choice of provider and agrees   with the discharge plan. [x] Yes [] No    Freedom of choice list was provided with basic dialogue that supports the patient's individualized plan of care/goals, treatment preferences and shares the quality data associated with the providers.  [x] Yes [] No

## 2022-05-31 NOTE — PROGRESS NOTES
Hospitalist Progress Note      SYNOPSIS: Patient admitted on 2022 for STEMI (ST elevation myocardial infarction) [facility])  presented to [facility] ER with complaint of chest pain. In the ER she was found to have STEMI with ST elevations in lead I and aVL and lead V2. She was given aspirin Brilinta, heparin bolus, started on heparin drip and transferred emergently to [facility] for cardiac catheterization. She was found to have acute 100% occlusion of mid to distal LAD ( ? Embolic event due to PAF versus ruptured plaque ), status post primary PCI using two drug-eluting stents with persistence of no reflow despite vasodilator therapy. 50%-60% mid PDA stenosis. PAF during PCI. Patient has persistent CP after procedure, started on colchicine. Patient will need placement      SUBJECTIVE:    Complaining of shortness of breath since procedure  Has no new issues otherwise  Chest pain is improved      Temp (24hrs), Av.5 °F (36.4 °C), Min:96.8 °F (36 °C), Max:98.5 °F (36.9 °C)    DIET: ADULT DIET; Regular; Low Fat/Low Chol/High Fiber/2 gm Na  CODE: Limited    Intake/Output Summary (Last 24 hours) at 2022 1228  Last data filed at 2022 1218  Gross per 24 hour   Intake 230 ml   Output 1050 ml   Net -820 ml       Review of Systems  All bolded are positive; please see HPI  General:  Fever, chills, diaphoresis, fatigue, malaise, night sweats, weight loss  Psychological:  Anxiety, disorientation, hallucinations. ENT:  Epistaxis, headaches, vertigo, visual changes. Cardiovascular:  Chest pain, irregular heartbeats, palpitations, paroxysmal nocturnal dyspnea. Respiratory:  Shortness of breath, coughing, sputum production, hemoptysis, wheezing, orthopnea.   Gastrointestinal:  Nausea, vomiting, diarrhea, heartburn, constipation, abdominal pain, hematemesis, hematochezia, melena, acholic stools  Genito-Urinary:  Dysuria, urgency, frequency, hematuria  Musculoskeletal:  Joint pain, joint stiffness, joint swelling, muscle pain  Neurology:  Headache, focal neurological deficits, weakness, numbness, paresthesia  Derm:  Rashes, ulcers, excoriations, bruising  Extremities:  Decreased ROM, peripheral edema, mottling      OBJECTIVE:    BP 95/62   Pulse 86   Temp 96.8 °F (36 °C) (Temporal)   Resp 18   Ht 5' 5\" (1.651 m)   Wt 279 lb 3.2 oz (126.6 kg)   SpO2 99%   BMI 46.46 kg/m²     General appearance:  awake, alert, and oriented to person, place, time, and purpose; appears stated age and cooperative; no apparent distress no labored breathing 3L  HEENT:  Conjunctivae/corneas clear. Neck: Supple. No jugular venous distention. Respiratory: symmetrical; clear to auscultation bilaterally; no wheezes; no rhonchi; no rales  Cardiovascular: rhythm regular; rate controlled; no murmurs  Abdomen: Soft, nontender, nondistended  Extremities:  peripheral pulses present; no peripheral edema; no ulcers  Musculoskeletal: No clubbing, cyanosis, no bilateral lower extremity edema. Brisk capillary refill. Skin:  No rashes  on visible skin  Neurologic: awake, alert and following commands     ASSESSMENT and PLAN:    · STEMI/CAD- S/p cardiac catheterization acute 100% occlusion of mid to distal LAD ( ? Embolic event due to PAF versus ruptured plaque ), status post primary PCI using two drug-eluting stents with persistence of no reflow despite vasodilator therapy. 50%-60% mid PDA stenosis. PAF during PCI. Aspirin for one week, Plavix, eliquis, statin. Toprol and entresto to be started depending on BP and HR. Echocardiogram. Cardiac rehab. Likely needs placement. Await PTOT evals. Sed rate and CRP.    · Elevated proBNP- start lasix  · Elevated sed rate/CRP- started on colchicine  · PAF- during PCI  · Hypertension  · Hyperlipidemia  · Diabetes mellitus type II  · Elevated LFTs- hold statin  · Obesity class III- BMI 48  · Fibromyalgia    PT evaluation noted  [facility] score 15  Possible subacute rehab      Medications:  REVIEWED DAILY    Infusion Medications    sodium chloride       Scheduled Medications    metoprolol succinate  50 mg Oral BID    atorvastatin  40 mg Oral Nightly    spironolactone  25 mg Oral Daily    pregabalin  100 mg Oral TID    furosemide  40 mg IntraVENous BID    colchicine  0.6 mg Oral BID    sodium chloride flush  5-40 mL IntraVENous 2 times per day    clopidogrel  75 mg Oral Daily    apixaban  5 mg Oral BID    isosorbide mononitrate  30 mg Oral Daily    sacubitril-valsartan  1 tablet Oral BID    pantoprazole  40 mg Oral QAM AC    rOPINIRole  2 mg Oral Nightly     PRN Meds: fentanNYL, LORazepam, HYDROcodone 5 mg - acetaminophen, sodium chloride flush, sodium chloride, acetaminophen, perflutren lipid microspheres, nitroGLYCERIN, [Held by provider] hydrOXYzine    Labs:     Recent Labs     05/29/22  0509 05/30/22  0547 05/31/22  0927   WBC 11.1 12.0* 9.8   HGB 10.9* 9.8* 9.8*   HCT 36.5 32.3* 32.0*    205 230       Recent Labs     05/29/22  0509 05/30/22  0547 05/31/22  0927    137 137   K 4.6 4.2 4.0    100 100   CO2 20* 21* 23   BUN 15 19 21   CREATININE 1.0 1.0 1.0   CALCIUM 9.4 8.8 8.6   PHOS 3.2  --   --        Recent Labs     05/29/22  0509 05/30/22  0547 05/31/22  0927   PROT 6.8 6.3* 6.3*   ALKPHOS 122* 101 97   ALT 56* 36* 25   * 75* 45*   BILITOT 0.7 0.8 0.6       No results for input(s): INR in the last 72 hours. No results for input(s): Bhanu Ackerman in the last 72 hours.     Chronic labs:    Lab Results   Component Value Date    CHOL 154 05/29/2022    TRIG 116 05/29/2022    HDL 54 05/29/2022    LDLCALC 77 05/29/2022    TSH 1.510 05/29/2022    INR 1.0 10/18/2016    LABA1C 6.1 (H) 05/29/2022       Radiology: REVIEWED DAILY    +++++++++++++++++++++++++++++++++++++++++++++++++  MD Owen BrionesSt. [facility], [place]  +++++++++++++++++++++++++++++++++++++++++++++++++  NOTE: This report was transcribed using voice recognition software. Every effort was made to ensure accuracy; however, inadvertent computerized transcription errors may be present.

## 2022-05-31 NOTE — PROGRESS NOTES
Physical Therapy  Physical Therapy Initial Assessment     Name: Carol Ulloa  : 1955  MRN: [id number]      Date of Service: 2022    Evaluating PT:  Joshua Goode, PT, DPT [id number]    Room #:  3606/4343-R  Diagnosis:  STEMI (ST elevation myocardial infarction) [facility]) [I21.3]  PMHx/PSHx:  See chart  Procedure/Surgery:   LHC, PCI  Precautions:  Falls, O2  Equipment Needs:  TBD    SUBJECTIVE:    Pt lives alone in a 2 story home, 1st floor setup with \"a couple\" stairs to enter and 1 rail. Pt ambulated with SPC and was independent PTA. OBJECTIVE:   Initial Evaluation  Date: 22 Treatment Short Term/ Long Term   Goals   AM-PAC 6 Clicks      Was pt agreeable to Eval/treatment? Yes     Does pt have pain? No c/o pain     Bed Mobility  Rolling: NT  Supine to sit: SBA with HOB elevated  Sit to supine: NT  Scooting: SBA  Mod Independent   Transfers Sit to stand: Christy elevated surfaces  Stand to sit: Christy  Stand pivot: Christy with [employer]  Mod Independent with [employer]   Ambulation   50 feet with Christy with [employer]  >150 feet with Mod Independent with [employer]   Stair negotiation: ascended and descended NT  >4 steps with 1 rail Mod Independent   ROM BUE:  WFL  BLE:  WFL     Strength BUE:  WFL  BLE:  4/5  Increase by 1/3 MMT grade   Balance Sitting EOB:  SBA  Dynamic Standing:  Christy with [employer]  Sitting EOB:  Independent  Dynamic Standing:   Mod Independent with [employer]     Pt is A & O x 4  Sensation:  R hand paresthesias from IVs  Edema:  RUE    Therapeutic Exercises:  NA    Patient education  Pt educated on safety    Patient response to education:   Pt verbalized understanding Pt demonstrated skill Pt requires further education in this area   x x x     ASSESSMENT:    Conditions Requiring Skilled Therapeutic Intervention:    [x]Decreased strength     []Decreased ROM  [x]Decreased functional mobility  [x]Decreased balance   [x]Decreased endurance   [x]Decreased posture  [x]Decreased sensation  []Decreased coordination []Decreased vision  []Decreased safety awareness   []Increased pain       Comments:  Pt was in bed upon arrival, agreeable to initial evaluation. SOB noted with all activity but SpO2 remained >97% with all activity. Pt requested to use bedside commode prior to ambulation. Shuffled steps completed to commode with flexed posture. Poor safety noted with transfer and stand to sit technique until cues were provided. Once finished, pt completed ambulation in room with SOB and occasional brief standing rest breaks. Pt was left in chair with all needs met and call light in reach. RN aware. Treatment:  Patient practiced and was instructed in the following treatment:     Bed mobility training - pt given verbal cues to facilitate proper sequencing and safety during supine>sit as well as provided with physical assistance.  Sitting EOB for >3 minutes for upright tolerance, postural awareness and BLE ROM   Transfer training - pt was given verbal and tactile cues to facilitate proper hand placement, technique and safety during sit to stand, stand to sit and stand pivot transfers as well as provided with physical assistance.  Gait training- pt was given verbal and tactile cues to facilitate safety and balance during ambulation as well as provided with physical assistance. Pt's/ family goals   1. Return home    Prognosis is good for reaching above PT goals. Patient and or family understand(s) diagnosis, prognosis, and plan of care.   yes    PHYSICAL THERAPY PLAN OF CARE:    PT POC is established based on physician order and patient diagnosis     Referring provider/PT Order:  [address], DO /05/28/22 1315 PT eval and treat  Diagnosis:  STEMI (ST elevation myocardial infarction) ([facility] [address] [I21.3]  Specific instructions for next treatment:  Progress activity    Current Treatment Recommendations:     [x] Strengthening to improve independence with functional mobility   [] ROM to improve independence with functional mobility   [x] Balance Training to improve static/dynamic balance and to reduce fall risk  [x] Endurance Training to improve activity tolerance during functional mobility   [x] Transfer Training to improve safety and independence with all functional transfers   [x] Gait Training to improve gait mechanics, endurance and asses need for appropriate assistive device  [x] Stair Training in preparation for safe discharge home and/or into the community   [] Positioning to prevent skin breakdown and contractures  [x] Safety and Education Training   [x] Patient/Caregiver Education   [] HEP  [] Other     PT long term treatment goals are located in above grid    Frequency of treatments: 2-5x/week x 1-2 weeks. Time in  0725  Time out  0750    Total Treatment Time  10 minutes     Evaluation Time includes thorough review of current medical information, gathering information on past medical history/social history and prior level of function, completion of standardized testing/informal observation of tasks, assessment of data and education on plan of care and goals.     CPT codes:  [x] Low Complexity PT evaluation 34002  [] Moderate Complexity PT evaluation 21315  [] High Complexity PT evaluation 00655  [] PT Re-evaluation 24868  [] Gait training 18605 - minutes  [] Manual therapy 64390 - minutes  [x] Therapeutic activities 46749 10 minutes  [] Therapeutic exercises 38099 - minutes  [] Neuromuscular reeducation 49823 - minutes     Eulogio Blackburn, PT, DPT  [id number]

## 2022-05-31 NOTE — PLAN OF CARE
Patient's chart updated to reflect:      . - HF care plan, HF education points and HF discharge instructions.  -Orders: 2 gram sodium diet, daily weights, I/O.  -PCP and cardiology follow up appointments to be scheduled within 7 days of hospital discharge. -CHF education session will be provided to the patient prior to hospital discharge.     Solomon Batista RN RN, BSN  Heart Failure Navigator

## 2022-05-31 NOTE — PROGRESS NOTES
[address], [address] [place] [address]                                                Patient Name: Seven Booth  MRN: [id number]  : 1955  Room: [address] [place] [address]     Evaluating OT:Ashly Cummings OTR/L   License #  [id number]       Referring Provider: Joanna Aquino DO    Specific Provider Orders/Date: OT evaluation & treatment        Diagnosis:  STEMI    Pertinent Medical History:  has a past medical history of Anxiety, Arthritis, Asthma, Depression, Diabetes mellitus ([facility] [address] Fibromyalgia, Hyperlipidemia, Hypertension, MI (myocardial infarction) ([facility] [address] Restless leg syndrome, and TIA (transient ischemic attack). Surgery: 22:  Emergency left heart catheterization and primary PCI    Past Surgical History:  has a past surgical history that includes Cholecystectomy; joint replacement; Cardiac catheterization; Breast surgery; and Coronary angioplasty with stent (2022). Precautions:  Fall Risk, 2L O2 via NC      Assessment of current deficits    [x] Functional mobility            [x]ADLs           [x] Strength                  [x]Cognition    [x] Functional transfers          [x] IADLs         [x] Safety Awareness   [x]Endurance    [x] Fine Coordination                         [x] Balance      [] Vision/perception   [x]Sensation      []Gross Motor Coordination             [] ROM           [] Delirium                   [] Motor Control      OT PLAN OF CARE   OT POC based on physician orders, patient diagnosis and results of clinical assessment     Frequency/Duration: 2-4 days/wk for 2 weeks PRN   Specific OT Treatment Interventions to include:    Instruction/training on adapted ADL techniques and AE recommendations to increase functional independence within precautions  Training on energy conservation strategies, correct breathing pattern and techniques to improve independence/tolerance for self-care routine  Functional transfer/mobility training/DME recommendations for increased independence, safety, and fall prevention  Patient/Family education to increase follow through with safety techniques and functional independence  Recommendation of environmental modifications for increased safety with functional transfers/mobility and ADLs  Therapeutic exercise to improve motor endurance, ROM, and functional strength for ADLs/functional transfers  Therapeutic activities to facilitate/challenge dynamic balance, stand tolerance for increased safety and independence with ADLs  Therapeutic activities to facilitate gross/fine motor skills for increased independence with ADLs  Positioning to improve skin integrity, interaction with environment and functional independence     Recommended Adaptive Equipment:  TBD for LB ADL A.E., A.D. per PT     Home Living: Pt lives alone in a 2 story with 2 steps to enter with 1 HR. B&B on main level. Bathroom setup: tub/shower, std. commode   Equipment owned: spc, shower chair     Prior Level of Function: Pt. states Ind. with ADLs & Ind. with IADLs but getting more difficult as of late; ambulated spc. Driving: active  Occupation: retired cardiac technician     Pain Level:  Min. Back pain; chronic in nature per pt. Cognition: A&O: 4/4; Follows multi- step directions              Memory:  good              Sequencing:  good              Problem solving:  good              Judgement/safety:  good                Functional Assessment:  AM-PAC Daily Activity Raw Score: 19/24    Initial Eval Status  Date: 5-31-22 Treatment Status  Date: STGs = LTGs  Time frame: 10-14 days   Feeding Ind. Mod I/ Ind   Grooming Set up   Modified Chana    UB Dressing SBA   Modified Chana    LB Dressing Min A with figure 4 technique seated EOB   Modified Chana    Bathing Min A with sim.  task   Modified [place]ana    Toileting Min A/SBA hygiene Modified [place]    Bed Mobility  Supine to sit: SBA  Sit to supine: NT   Supine to sit: Modified [place]   Sit to supine: Modified [place]    Functional Transfers Min A with sit <> stand, SPT with ww   Modified [place]    Functional Mobility Min A with ww functional home distances   Modified [place]    Balance Sitting:     Static:  Sup    Dynamic:SBA  Standing: Min A       Activity Tolerance Fair-   Noted min. SOB with ax. Fair+/good   Visual/  Perceptual Glasses: yes          Vitals spO2 97% on 2L  HR 82 after ax.   wfl      Hand Dominance R    AROM (PROM) Strength Additional Info:    RUE  WFL Grossly 4/5 good  and wfl FMC/dexterity noted during ADL tasks      LUE WFL Grossly 4/5 good  and wfl FMC/dexterity noted during ADL tasks         [facility]   Sensation:  No c/o numbness or tingling B fingertips occasionally  Tone: WFL B UE  Edema: none noted B UE     Comments: Upon arrival patient supine in bed, agreeable to OT, cleared by Nursing. Therapist facilitated bed mobility/ADLs/ functional transfers/mobility with focus on safety, technique & precautions. Pt. Instructed RE: safe transfers/mobility, ADLs, role of OT, treatment plan, recs. , prec. At end of session, patient seated in bedside chair, all needs met, RN notified, with call light and phone within reach, all lines and tubes intact. Overall patient demonstrated decreased strength, balance, independence & safety during completion of ADL/functional transfer/mobility tasks. Pt would benefit from continued skilled OT to increase safety and independence with completion of ADL/IADL tasks for functional independence and quality of life.      Treatment: OT treatment provided this date includes:  ·  Instruction/training on safety and adapted techniques for completion of ADLs: to increase [place] in self care  ·  Instruction/training on safe functional mobility/transfer techniques: with focus on safety, technique & precautions  ·  Instruction/training on energy conservation/work simplification for completion of ADLs: techniques to increase [place] with self care ADLs & iADLs, work simplification to improve endurance  ·  Proper Positioning/Alignment: for optimal healing, skin integrity to prevent breakdown, decrease edema  · Skilled monitoring of vitals: to include BP, spO2 & HR during session  · Sitting/standing Balance/Tolerance- to increased balance & activity tolerance during ADLs as well as facilitate proper posture and/or positioning. Rehab Potential: Good for established goals     Patient / Family Goal: to return home soon     Patient and/or family were instructed on functional diagnosis, prognosis/goals and OT plan of care. Demonstrated good understanding. Eval Complexity: Low     Time In: 7:30  Time Out: 7:55  Total Treatment Time: 10    Min Units   OT Eval Low 68600  x     OT Eval Medium 77751       OT Eval High 15979       OT Re-Eval [id number]       Therapeutic Ex 72497       Therapeutic Activities 03127       ADL/Self Care 31120  10  1   Orthotic Management 96275       Manual [id number]       Neuro Re-Ed 52079       Non-Billable Time          Evaluation Time additionally includes thorough review of current medical information, gathering information on past medical history/social history and prior level of function, interpretation of standardized testing/informal observation of tasks, assessment of data and development of plan of care and goals. Ashly Cummings, OTR/L   License #  [id number]

## 2022-05-31 NOTE — PROGRESS NOTES
PROGRESS NOTE     CARDIOLOGY    Chief complaint: Seen today for follow up, management & recommendations for coronary artery disease, ischemic cardiomyopathy    She denies chest pain today. She continues to feel shortness of breath and orthopnea symptoms. Wt Readings from Last 3 Encounters:   05/30/22 279 lb 3.2 oz (126.6 kg)     Temp Readings from Last 3 Encounters:   05/31/22 96.8 °F (36 °C) (Temporal)     BP Readings from Last 3 Encounters:   05/31/22 95/62     Pulse Readings from Last 3 Encounters:   05/31/22 86         Intake/Output Summary (Last 24 hours) at 5/31/2022 1718  Last data filed at 5/31/2022 1338  Gross per 24 hour   Intake 500 ml   Output 1050 ml   Net -550 ml       Recent Labs     05/29/22  0509 05/30/22  0547 05/31/22  0927   WBC 11.1 12.0* 9.8   HGB 10.9* 9.8* 9.8*   HCT 36.5 32.3* 32.0*   MCV 85.7 85.4 83.8    205 230     Recent Labs     05/29/22  0509 05/29/22  0509 05/30/22  0547 05/31/22  0927     --  137 137   K 4.6   < > 4.2 4.0  4.0     --  100 100   CO2 20*  --  21* 23   PHOS 3.2  --   --   --    BUN 15  --  19 21   CREATININE 1.0  --  1.0 1.0   MG 2.1  --   --   --     < > = values in this interval not displayed. No results for input(s): PROTIME, INR in the last 72 hours. No results for input(s): CKTOTAL, CKMB, CKMBINDEX, TROPONINI in the last 72 hours. No results for input(s): BNP in the last 72 hours. Recent Labs     05/29/22  0509   CHOL 154   HDL 54   TRIG 116     No results for input(s): TROPHS in the last 72 hours.       metoprolol succinate (TOPROL XL) extended release tablet 50 mg, BID  atorvastatin (LIPITOR) tablet 40 mg, Nightly  spironolactone (ALDACTONE) tablet 25 mg, Daily  fentaNYL (SUBLIMAZE) injection 50 mcg, Q2H PRN  LORazepam (ATIVAN) tablet 0.5 mg, Q8H PRN  HYDROcodone-acetaminophen (NORCO) 5-325 MG per tablet 1 tablet, Q6H PRN  pregabalin (LYRICA) capsule 100 mg, TID  furosemide (LASIX) injection 40 mg, BID  colchicine (COLCRYS) tablet 0.6 mg, BID  sodium chloride flush 0.9 % injection 5-40 mL, 2 times per day  sodium chloride flush 0.9 % injection 5-40 mL, PRN  0.9 % sodium chloride infusion, PRN  acetaminophen (TYLENOL) tablet 650 mg, Q4H PRN  clopidogrel (PLAVIX) tablet 75 mg, Daily  apixaban (ELIQUIS) tablet 5 mg, BID  perflutren lipid microspheres (DEFINITY) injection 1.65 mg, ONCE PRN  isosorbide mononitrate (IMDUR) extended release tablet 30 mg, Daily  sacubitril-valsartan (ENTRESTO) 24-26 MG per tablet 1 tablet, BID  nitroGLYCERIN (NITROSTAT) SL tablet 0.4 mg, Q5 Min PRN  [Held by provider] hydrOXYzine (VISTARIL) capsule 50 mg, TID PRN  pantoprazole (PROTONIX) tablet 40 mg, QAM AC  rOPINIRole (REQUIP) tablet 2 mg, Nightly        Review of systems:     Heart: as above   Lungs: as above   Eyes: denies changes in vision or discharge. Ears: denies changes in hearing or pain. Nose: denies epistaxis or masses   Throat: denies sore throat or trouble swallowing. Neuro: denies numbness, tingling, tremors. Skin: denies rashes or itching. : denies hematuria, dysuria   GI: denies vomiting, diarrhea   Psych: denies mood changed, anxiety, depression. Physical exam:    Constitutional: A&O x3, communicates well, no acute distress. Eyes: extraocular muscles intact, PERRL. Normal lids & conjunctiva. No icterus. ENT: clear, no bleeding. No external masses. Lips normal formation. Neck: Thick, multiple chins. Difficult to examine. Supple, full ROM. Difficult to examine but she does have JVD, no bruits, no lymphadenopathy. No masses. trachea midline. Heart: Distant. Regular rate & rhythm, normal S1 & S2, no abnormal murmurs. No heave. Lungs: Poor air movement. Difficult exam.  Bibasilar rales. No accessory muscles. Abd: soft, non-tender. Normal bowel sounds. Super morbidly obese. Neuro: Full ROM X 4, EOMI, no tremors. EXT: Trace bilateral lower extremity edema  Skin: warm, dry, intact. Good turgor.   Psych: A&O x 3, normal behavior, not anxious. Assessment/Recommendations  1. Anterior STEMI. Post MI chest pain being treated for pericarditis. No chest pain today. 2. Coronary artery disease. 2 drug-eluting stents to the mid to distal LAD complicated by no reflow. 3. Ischemic cardiomyopathy. Difficult exam but hypervolemic to my exam.  Continue IV diuresing. Titrate medications as blood pressure will allow. 4. Paroxysmal atrial fibrillation. In sinus today. On Eliquis. 5. Hypertension  6. Hypercholesterolemia  7. Diabetes  8. Prior TIA  9. Restless leg syndrome  10. Fibromyalgia  11. Morbid obesity    Note: This report was completed using computerized voice recognition software. Every effort has been made to ensure accuracy, however; and invert and computerized transcription errors may be present.

## 2022-05-31 NOTE — CONSULTS
Patient currently admitted with diagnosis of Systolic heart failure. Patient was sitting up in bed with oxygen on and patient appeared to be uncomfortable ongoing leg pain during the consultation. She was engaged and asked appropriate questions throughout the education session. She is agreeable to follow up wit PCP and cardiology in [facility] and open to [facility]. Scheduling with the CHF clinic No: patient has one friend who at times can drive her but financial hardship getting to [place] from [facility]. Overwhelmed with new diagnosis's. .    No future appointments. Patient possible transfer to rehab facility therefore appointment not made but phone numbers provided for patient on discharge instructions. We reviewed the introduction to Heart Failure, the HF zones, signs and symptoms to report on day 1 of onset, medications, medication compliance, the importance of obtaining daily weights, following a low sodium diet, reading food labels for the sodium content, keeping physician appointments, and smoking cessation. We discussed writing / tracking daily weights on a calendar / log because a 5 pound gain in 1 week can sneak up if you are not tracking it. Contributing risk factors for Heart Failure are identified as presented with chest pain and short of breath to local hospital and transfererred to [facility]. Patient overwhelmed with treatment plan and diagnosis. Uncertain if she wants life vest use. Understands use but wants to decide on her advanced directives use optiions. \"may not want defribrillerated\" she will discuss with cardiology upon rounds. Patient has scale at home for daily weights and agreeable to documenting heart failure zone use. I advised patient they can reduce the risk for Heart Failure exacerbations by modifying / controlling the risk factors.  We discussed self-managed care which includes the following:  to take medications as prescribed, report any controlled diet Yes  Fluid restriction daily ordered (fluid restriction recommended if patient is hyponatremic and/or diuretic is initiated or increased) No  FR:   Daily Weights: Patient Vitals for the past 96 hrs (Last 3 readings):   Weight   05/30/22 0600 279 lb 3.2 oz (126.6 kg)   05/28/22 0527 291 lb 7.2 oz (132.2 kg)   05/28/22 0308 278 lb (126.1 kg)     I/O:     Intake/Output Summary (Last 24 hours) at 5/31/2022 0924  Last data filed at 5/31/2022 0912  Gross per 24 hour   Intake 230 ml   Output 700 ml   Net -470 ml            Edgardo Soulier, RN BSN, RN  Heart Failure Navigator      CONGESTIVE HEART FAILURE (CHF) AHA GUIDELINES  (Must be completed for Primary Diagnosis CHF or History of CHF)    Discharge Plan:  I placed the Heart Failure Home Instructions in patient's discharge instructions. Per Heart Failure GWTG, the patient should have a follow-up appointment made within 7 days of discharge. New Diagnosis Yes    ECHO Results most recent:  No results found for: LVEF, LVEFMODE     5/29/2022 EF 30-35% per  Echo  Ordered life vest use this admission                                    Social History     Tobacco Use   Smoking Status Never Smoker   Smokeless Tobacco Never Used          There is no immunization history on file for this patient.        Angiotensin-Converting-Enzyme (ACE) inhibitor ordered:  [] Yes  [x] No (specify contraindication):    [] Contraindicated  [] Hypotensive patient who was at immediate risk of cardiogenic shock  [] Hospitalized patient who experienced marked azotemia  [] Other Contraindications  [] Not Eligible  [] Not Tolerant  [] Patient Reason  [] System Reason  [x] Other Reason    Angiotensin II receptor blockers (ARB) ordered:  [] Yes  [x] No (specify contraindication):    [] Contraindicated  [] Hypotensive patient who was at immediate risk of cardiogenic shock  [] Hospitalized patient who experienced marked azotemia  [] Other Contraindications    ARNI - Angiotensin Receptor Neprilysin Inhibitor ordered:  [x] Yes  [] No (specify contraindication):    [] ACE inhibitor use within the prior 36 hours  [] Allergy  [] Hyperkalemia  [] Hypotension  [] Renal dysfunction defined as creatinine > 2.5 mg/dL in men or > 2.0 mg/dL in women  [] Other Contraindications  [] Not Eligible  [] Not Tolerant  [] Patient Reason  []System Reason  []Other Reason      Beta Blocker (Carvedilol, Metoprolol Succinate, or Bisoprolol) ordered:    [x] Yes  [] No (specify contraindication):    [] Contraindicated  [] Asthma  [] Fluid Overload  [] Low Blood Pressure  [] Patient recently treated with an intravenous positive inotropic agent  [] Other Contraindications  [] Not Eligible  [] Not Tolerant  [] Patient Reason  [] System Reason    SGLT2 Inhibitor ordered:  [] Yes  [x] No (specify contraindication):    [] Contraindicated  [] Patient currently on dialysis  [] Ketoacidosis  [] Known hypersensitivity to the medication  [] Type I diabetes (not approved for use in patients with Type I diabetes due to increased risk of ketoacidosis)  [] Other Contraindications  [] Not Eligible  [] Not Tolerant  [] Patient Reason  [x] System Reason  [] Other Reason    Aldosterone Antagonist ordered:  [x] Yes  [] No (specify contraindication):    [] Contraindicated  [] Allergy due to aldosterone receptor antagonist  [] Hyperkalemia  [] Renal dysfunction defined as creatinine >2.5 mg/dL in men or >2.0 mg/dL in women.   [] Other contraindications  [] Not Eligible  [] Not Tolerant  [] Patient Reason  [] System Reason  [] Other Reason

## 2022-06-01 ENCOUNTER — APPOINTMENT (OUTPATIENT)
Dept: GENERAL RADIOLOGY | Age: 67
DRG: 246 | End: 2022-06-01
Attending: STUDENT IN AN ORGANIZED HEALTH CARE EDUCATION/TRAINING PROGRAM
Payer: MEDICARE

## 2022-06-01 LAB
ALBUMIN SERPL-MCNC: 2.8 G/DL (ref 3.5–5.2)
ALP BLD-CCNC: 88 U/L (ref 35–104)
ALT SERPL-CCNC: 21 U/L (ref 0–32)
ANION GAP SERPL CALCULATED.3IONS-SCNC: 13 MMOL/L (ref 7–16)
AST SERPL-CCNC: 32 U/L (ref 0–31)
BILIRUB SERPL-MCNC: 0.4 MG/DL (ref 0–1.2)
BUN BLDV-MCNC: 26 MG/DL (ref 6–23)
CALCIUM SERPL-MCNC: 8.3 MG/DL (ref 8.6–10.2)
CHLORIDE BLD-SCNC: 100 MMOL/L (ref 98–107)
CO2: 26 MMOL/L (ref 22–29)
CREAT SERPL-MCNC: 1.1 MG/DL (ref 0.5–1)
EKG ATRIAL RATE: 73 BPM
EKG Q-T INTERVAL: 326 MS
EKG QRS DURATION: 96 MS
EKG QTC CALCULATION (BAZETT): 464 MS
EKG R AXIS: 96 DEGREES
EKG T AXIS: -18 DEGREES
EKG VENTRICULAR RATE: 122 BPM
GFR AFRICAN AMERICAN: 60
GFR NON-AFRICAN AMERICAN: 50 ML/MIN/1.73
GLUCOSE BLD-MCNC: 127 MG/DL (ref 74–99)
HCT VFR BLD CALC: 31.4 % (ref 34–48)
HEMOGLOBIN: 9.5 G/DL (ref 11.5–15.5)
MCH RBC QN AUTO: 25.5 PG (ref 26–35)
MCHC RBC AUTO-ENTMCNC: 30.3 % (ref 32–34.5)
MCV RBC AUTO: 84.2 FL (ref 80–99.9)
PDW BLD-RTO: 15.5 FL (ref 11.5–15)
PLATELET # BLD: 228 E9/L (ref 130–450)
PMV BLD AUTO: 10.5 FL (ref 7–12)
POTASSIUM SERPL-SCNC: 3.3 MMOL/L (ref 3.5–5)
RBC # BLD: 3.73 E12/L (ref 3.5–5.5)
SODIUM BLD-SCNC: 139 MMOL/L (ref 132–146)
TOTAL PROTEIN: 5.8 G/DL (ref 6.4–8.3)
WBC # BLD: 6.8 E9/L (ref 4.5–11.5)

## 2022-06-01 PROCEDURE — 80053 COMPREHEN METABOLIC PANEL: CPT

## 2022-06-01 PROCEDURE — 2580000003 HC RX 258: Performed by: INTERNAL MEDICINE

## 2022-06-01 PROCEDURE — 6370000000 HC RX 637 (ALT 250 FOR IP): Performed by: INTERNAL MEDICINE

## 2022-06-01 PROCEDURE — 2700000000 HC OXYGEN THERAPY PER DAY

## 2022-06-01 PROCEDURE — 85027 COMPLETE CBC AUTOMATED: CPT

## 2022-06-01 PROCEDURE — 2140000000 HC CCU INTERMEDIATE R&B

## 2022-06-01 PROCEDURE — 6370000000 HC RX 637 (ALT 250 FOR IP): Performed by: FAMILY MEDICINE

## 2022-06-01 PROCEDURE — 6360000002 HC RX W HCPCS: Performed by: INTERNAL MEDICINE

## 2022-06-01 PROCEDURE — 36415 COLL VENOUS BLD VENIPUNCTURE: CPT

## 2022-06-01 PROCEDURE — 71046 X-RAY EXAM CHEST 2 VIEWS: CPT

## 2022-06-01 PROCEDURE — 99232 SBSQ HOSP IP/OBS MODERATE 35: CPT | Performed by: INTERNAL MEDICINE

## 2022-06-01 RX ORDER — POTASSIUM CHLORIDE 20 MEQ/1
40 TABLET, EXTENDED RELEASE ORAL ONCE
Status: COMPLETED | OUTPATIENT
Start: 2022-06-01 | End: 2022-06-01

## 2022-06-01 RX ORDER — DIGOXIN 0.25 MG/ML
250 INJECTION INTRAMUSCULAR; INTRAVENOUS ONCE
Status: COMPLETED | OUTPATIENT
Start: 2022-06-01 | End: 2022-06-01

## 2022-06-01 RX ADMIN — PANTOPRAZOLE SODIUM 40 MG: 40 TABLET, DELAYED RELEASE ORAL at 06:19

## 2022-06-01 RX ADMIN — COLCHICINE 0.6 MG: 0.6 TABLET ORAL at 21:40

## 2022-06-01 RX ADMIN — ACETAMINOPHEN 650 MG: 325 TABLET ORAL at 16:06

## 2022-06-01 RX ADMIN — METOPROLOL SUCCINATE 50 MG: 50 TABLET, EXTENDED RELEASE ORAL at 21:50

## 2022-06-01 RX ADMIN — POTASSIUM CHLORIDE 40 MEQ: 20 TABLET, EXTENDED RELEASE ORAL at 15:51

## 2022-06-01 RX ADMIN — APIXABAN 5 MG: 5 TABLET, FILM COATED ORAL at 08:24

## 2022-06-01 RX ADMIN — ROPINIROLE HYDROCHLORIDE 2 MG: 2 TABLET, FILM COATED ORAL at 21:41

## 2022-06-01 RX ADMIN — POTASSIUM CHLORIDE 40 MEQ: 20 TABLET, EXTENDED RELEASE ORAL at 09:45

## 2022-06-01 RX ADMIN — PREGABALIN 100 MG: 100 CAPSULE ORAL at 15:51

## 2022-06-01 RX ADMIN — PREGABALIN 100 MG: 100 CAPSULE ORAL at 08:24

## 2022-06-01 RX ADMIN — CLOPIDOGREL BISULFATE 75 MG: 75 TABLET ORAL at 08:24

## 2022-06-01 RX ADMIN — SACUBITRIL AND VALSARTAN 1 TABLET: 24; 26 TABLET, FILM COATED ORAL at 09:45

## 2022-06-01 RX ADMIN — ACETAMINOPHEN 650 MG: 325 TABLET ORAL at 21:40

## 2022-06-01 RX ADMIN — PREGABALIN 100 MG: 100 CAPSULE ORAL at 21:40

## 2022-06-01 RX ADMIN — SODIUM CHLORIDE, PRESERVATIVE FREE 10 ML: 5 INJECTION INTRAVENOUS at 21:48

## 2022-06-01 RX ADMIN — METOPROLOL SUCCINATE 50 MG: 50 TABLET, EXTENDED RELEASE ORAL at 09:45

## 2022-06-01 RX ADMIN — ZOLPIDEM TARTRATE 5 MG: 5 TABLET ORAL at 21:40

## 2022-06-01 RX ADMIN — APIXABAN 5 MG: 5 TABLET, FILM COATED ORAL at 21:41

## 2022-06-01 RX ADMIN — SODIUM CHLORIDE, PRESERVATIVE FREE 10 ML: 5 INJECTION INTRAVENOUS at 04:56

## 2022-06-01 RX ADMIN — SPIRONOLACTONE 25 MG: 25 TABLET ORAL at 11:59

## 2022-06-01 RX ADMIN — COLCHICINE 0.6 MG: 0.6 TABLET ORAL at 08:24

## 2022-06-01 RX ADMIN — DIGOXIN 250 MCG: 0.25 INJECTION INTRAMUSCULAR; INTRAVENOUS at 04:56

## 2022-06-01 RX ADMIN — ATORVASTATIN CALCIUM 40 MG: 40 TABLET, FILM COATED ORAL at 21:41

## 2022-06-01 RX ADMIN — NITROGLYCERIN 0.4 MG: 0.4 TABLET, ORALLY DISINTEGRATING SUBLINGUAL at 21:39

## 2022-06-01 ASSESSMENT — PAIN DESCRIPTION - PAIN TYPE: TYPE: ACUTE PAIN

## 2022-06-01 ASSESSMENT — PAIN DESCRIPTION - DESCRIPTORS
DESCRIPTORS: NUMBNESS;ACHING;DISCOMFORT
DESCRIPTORS: ACHING;DISCOMFORT;DULL
DESCRIPTORS: HEAVINESS;DISCOMFORT;ACHING

## 2022-06-01 ASSESSMENT — PAIN SCALES - GENERAL
PAINLEVEL_OUTOF10: 2
PAINLEVEL_OUTOF10: 0
PAINLEVEL_OUTOF10: 3
PAINLEVEL_OUTOF10: 8
PAINLEVEL_OUTOF10: 7
PAINLEVEL_OUTOF10: 4
PAINLEVEL_OUTOF10: 5

## 2022-06-01 ASSESSMENT — PAIN - FUNCTIONAL ASSESSMENT: PAIN_FUNCTIONAL_ASSESSMENT: ACTIVITIES ARE NOT PREVENTED

## 2022-06-01 ASSESSMENT — PAIN DESCRIPTION - FREQUENCY
FREQUENCY: CONTINUOUS
FREQUENCY: CONTINUOUS

## 2022-06-01 ASSESSMENT — PAIN DESCRIPTION - LOCATION
LOCATION: BACK
LOCATION: OTHER (COMMENT);CHEST
LOCATION: FOOT
LOCATION: FOOT
LOCATION: CHEST

## 2022-06-01 ASSESSMENT — PAIN DESCRIPTION - ONSET
ONSET: ON-GOING
ONSET: GRADUAL

## 2022-06-01 ASSESSMENT — PAIN DESCRIPTION - ORIENTATION: ORIENTATION: LOWER

## 2022-06-01 NOTE — PROGRESS NOTES
Occupational Therapy  OT BEDSIDE TREATMENT NOTE   [address] [facility] [address] [address]        Date:2022  Patient Name: Jean Noguera  MRN: [id number]  : 1955  Room: [address] [place] [address]     Attempted to see pt this PM, with pt denying therapy, stating of just returning from xray, and has been up and moving all day, just wanting to rest. Will attempt at a later date/time.    [address] CRAVEN/L [id number]

## 2022-06-01 NOTE — PROGRESS NOTES
Patient walked in [place] without oxygen approximately 50 feet. Tolerated well. Oximeter ranged from  percent while walking. Put in chair after.

## 2022-06-01 NOTE — PROGRESS NOTES
PROGRESS NOTE     CARDIOLOGY    Chief complaint: Seen today for follow up, management & recommendations for coronary artery disease, ischemic cardiomyopathy    She denies chest pain today. She still continues to feel shortness of breath. Wt Readings from Last 3 Encounters:   06/01/22 274 lb 1.6 oz (124.3 kg)     Temp Readings from Last 3 Encounters:   06/01/22 97 °F (36.1 °C) (Temporal)     BP Readings from Last 3 Encounters:   06/01/22 118/71     Pulse Readings from Last 3 Encounters:   06/01/22 77         Intake/Output Summary (Last 24 hours) at 6/1/2022 1347  Last data filed at 5/31/2022 1838  Gross per 24 hour   Intake 240 ml   Output --   Net 240 ml       Recent Labs     05/30/22  0547 05/31/22  0927 06/01/22  0533   WBC 12.0* 9.8 6.8   HGB 9.8* 9.8* 9.5*   HCT 32.3* 32.0* 31.4*   MCV 85.4 83.8 84.2    230 228     Recent Labs     05/30/22  0547 05/31/22  0927 06/01/22  0533    137 139   K 4.2 4.0  4.0 3.3*    100 100   CO2 21* 23 26   BUN 19 21 26*   CREATININE 1.0 1.0 1.1*     No results for input(s): PROTIME, INR in the last 72 hours. No results for input(s): CKTOTAL, CKMB, CKMBINDEX, TROPONINI in the last 72 hours. No results for input(s): BNP in the last 72 hours. No results for input(s): CHOL, HDL, TRIG in the last 72 hours. Invalid input(s): CHOLHDLR, LDLCALCU  No results for input(s): TROPHS in the last 72 hours.       zolpidem (AMBIEN) tablet 5 mg, Nightly PRN  metoprolol succinate (TOPROL XL) extended release tablet 50 mg, BID  atorvastatin (LIPITOR) tablet 40 mg, Nightly  spironolactone (ALDACTONE) tablet 25 mg, Daily  fentaNYL (SUBLIMAZE) injection 50 mcg, Q2H PRN  LORazepam (ATIVAN) tablet 0.5 mg, Q8H PRN  HYDROcodone-acetaminophen (NORCO) 5-325 MG per tablet 1 tablet, Q6H PRN  pregabalin (LYRICA) capsule 100 mg, TID  [Held by provider] furosemide (LASIX) injection 40 mg, BID  colchicine (COLCRYS) tablet 0.6 mg, BID  sodium chloride flush 0.9 % injection 5-40 mL, 2 times per day  sodium chloride flush 0.9 % injection 5-40 mL, PRN  0.9 % sodium chloride infusion, PRN  acetaminophen (TYLENOL) tablet 650 mg, Q4H PRN  clopidogrel (PLAVIX) tablet 75 mg, Daily  apixaban (ELIQUIS) tablet 5 mg, BID  perflutren lipid microspheres (DEFINITY) injection 1.65 mg, ONCE PRN  sacubitril-valsartan (ENTRESTO) 24-26 MG per tablet 1 tablet, BID  nitroGLYCERIN (NITROSTAT) SL tablet 0.4 mg, Q5 Min PRN  [Held by provider] hydrOXYzine (VISTARIL) capsule 50 mg, TID PRN  pantoprazole (PROTONIX) tablet 40 mg, QAM AC  rOPINIRole (REQUIP) tablet 2 mg, Nightly        Review of systems:     Heart: as above   Lungs: as above   Eyes: denies changes in vision or discharge. Ears: denies changes in hearing or pain. Nose: denies epistaxis or masses   Throat: denies sore throat or trouble swallowing. Neuro: denies numbness, tingling, tremors. Skin: denies rashes or itching. : denies hematuria, dysuria   GI: denies vomiting, diarrhea   Psych: denies mood changed, anxiety, depression. Physical exam:    Constitutional: A&O x3, communicates well, no acute distress. Eyes: extraocular muscles intact, PERRL. Normal lids & conjunctiva. No icterus. ENT: clear, no bleeding. No external masses. Lips normal formation. Neck: Thick, multiple chins. Difficult to examine. Supple, full ROM. Difficult to examine for JVD, no bruits, no lymphadenopathy. No masses. trachea midline. Heart: Distant to auscultation. Regular rate & rhythm, normal S1 & S2, no abnormal murmurs. No heave. Lungs: Poor air movement. Difficult exam.  Possible bibasilar rales. No accessory muscles. Abd: Super morbidly obese soft, non-tender. Normal bowel sounds. Neuro: Full ROM X 4, EOMI, no tremors. EXT: Trace bilateral lower extremity edema on exam  Skin: warm, dry, intact. Good turgor. Psych: A&O x 3, normal behavior, not anxious. Assessment/Recommendations  1. Anterior STEMI.   Post MI chest pain being treated for pericarditis. No chest pain today. 2. Coronary artery disease. 2 drug-eluting stents to the mid to distal LAD complicated by no reflow. 3. Ischemic cardiomyopathy. Very difficult exam but her volume status appears to be improving but still mild hypervolemia. She still complains of chronic shortness of breath. I will check a chest x-ray. She also needs to increase her physical activities. 4. Paroxysmal atrial fibrillation. In sinus today. On Eliquis. 5. Hypertension  6. Hypercholesterolemia  7. Diabetes  8. Prior TIA  9. Restless leg syndrome  10. Fibromyalgia  11. Morbid obesity    Note: This report was completed using computerized voice recognition software. Every effort has been made to ensure accuracy, however; and invert and computerized transcription errors may be present.

## 2022-06-01 NOTE — DISCHARGE INSTR - COC
Continuity of Care Form    Patient Name: Wendy Delgadillo   :  1955  MRN:  [id number]    Admit date:  2022  Discharge date:  2022    Code Status Order: Limited   Advance Directives:      Admitting Physician:  Sole Robert DO  PCP: No primary care provider on file. Discharging Nurse: Massimo Anthony Unit/Room#: 0026/7101-N  Discharging Unit Phone Number: [phone]    Emergency Contact:   Extended Emergency Contact Information  Primary Emergency Contact: Rubén [address] Phone: [phone]  Relation: Other  Secondary Emergency Contact: Board, [address]  Mobile Phone: [phone]  Relation: Brother/Sister    Past Surgical History:  Past Surgical History:   Procedure Laterality Date    BREAST SURGERY      breast reduction    CARDIAC CATHETERIZATION      CHOLECYSTECTOMY      CORONARY ANGIOPLASTY WITH STENT PLACEMENT  2022    2.5 x 34mm Resolute [place] to Mid LAD, and 2.5 x 30mm Resolute [place] to Distal LAD. Dr. Suzanna Salvador knees       Immunization History: There is no immunization history on file for this patient.     Active Problems:  Patient Active Problem List   Diagnosis Code    STEMI (ST elevation myocardial infarction) ([facility] [address] I21.3       Isolation/Infection:   Isolation            No Isolation          Patient Infection Status       None to display            Nurse Assessment:  Last Vital Signs: /71   Pulse 77   Temp 97 °F (36.1 °C) (Temporal)   Resp 18   Ht 5' 5\" (1.651 m)   Wt 274 lb 1.6 oz (124.3 kg)   SpO2 100%   BMI 45.61 kg/m²     Last documented pain score (0-10 scale): Pain Level: 0  Last Weight:   Wt Readings from Last 1 Encounters:   22 274 lb 1.6 oz (124.3 kg)     Mental Status:  oriented and alert    IV Access:  - None    Nursing Mobility/ADLs:  Walking   Assisted  Transfer  Independent  Bathing  Independent  Dressing  Assisted  Toileting  Independent  Feeding  Independent  Med [facility] Delivery   whole    Wound Care Documentation and Therapy:        Elimination:  Continence: Bowel: Yes  Bladder: Yes  Urinary Catheter: None   Colostomy/Ileostomy/Ileal Conduit: No       Date of Last BM: ***    Intake/Output Summary (Last 24 hours) at 6/1/2022 1452  Last data filed at 5/31/2022 1838  Gross per 24 hour   Intake 240 ml   Output --   Net 240 ml     I/O last 3 completed shifts: In: 740 [P.O.:740]  Out: [address] [Urine:1050]    Safety Concerns: At Risk for Falls    Impairments/Disabilities:      None    Nutrition Therapy:  Current Nutrition Therapy:   - Oral Diet:  Low Fat    Routes of Feeding: Oral  Liquids: No Restrictions  Daily Fluid Restriction: no  Last Modified Barium Swallow with Video (Video Swallowing Test): not done    Treatments at the Time of Hospital Discharge:   Respiratory Treatments: ***  Oxygen Therapy:  is not on home oxygen therapy.   2 L for comfort occasionally  Ventilator:    - No ventilator support    Rehab Therapies: Physical Therapy and Occupational Therapy  Weight Bearing Status/Restrictions: No weight bearing restrictions  Other Medical Equipment (for information only, NOT a DME order):  walker  Other Treatments: ***    Patient's personal belongings (please select all that are sent with patient):  El    RN SIGNATURE:  Electronically signed by Jamilah Polo RN on 6/6/22 at 11:25 AM EDT    CASE MANAGEMENT/SOCIAL WORK SECTION    Inpatient Status Date: 05/30/2022    Readmission Risk Assessment Score:  Readmission Risk              Risk of Unplanned Readmission:  14           Discharging to Facility/ Agency   Name: [facility]  Address: [address] [place] [address]  Phone: [phone]  Fax: [phone]    Dialysis Facility (if applicable)   Name:  Address:  Dialysis Schedule:  Phone:  Fax:    / signature: Electronically signed by Tom Claros RN on 6/6/2022 at 08:46 AM EDT    PHYSICIAN SECTION    Prognosis: {Prognosis:8227900437}    Condition at Discharge: Magdalena8 Tori Campuzano Patient Condition:[id number]}    Rehab Potential (if transferring to Rehab): {Prognosis:8053019328}    Recommended Labs or Other Treatments After Discharge: ***    Physician Certification: I certify the above information and transfer of Davon Mariano  is necessary for the continuing treatment of the diagnosis listed and that she requires [place] for less 30 days.      Update Admission H&P: {CHP DME Changes in [facility]:[id number]}    PHYSICIAN SIGNATURE:  {Esignature:[id number]}

## 2022-06-01 NOTE — PLAN OF CARE
Problem: Discharge Planning  Goal: Discharge to home or other facility with appropriate resources  Outcome: Progressing  Flowsheets (Taken 5/31/2022 1945)  Discharge to home or other facility with appropriate resources: Identify barriers to discharge with patient and caregiver     Problem: Chronic Conditions and Co-morbidities  Goal: Patient's chronic conditions and co-morbidity symptoms are monitored and maintained or improved  Outcome: Progressing  Flowsheets (Taken 5/31/2022 1945)  Care Plan - Patient's Chronic Conditions and Co-Morbidity Symptoms are Monitored and Maintained or Improved: Monitor and assess patient's chronic conditions and comorbid symptoms for stability, deterioration, or improvement     Problem: Safety - Adult  Goal: Free from fall injury  Outcome: Progressing  Flowsheets (Taken 6/1/2022 0159)  Free From Fall Injury: Instruct family/caregiver on patient safety

## 2022-06-01 NOTE — PROGRESS NOTES
Hospitalist Progress Note      SYNOPSIS: Patient admitted on 2022 for STEMI (ST elevation myocardial infarction) [facility])  presented to [facility] ER with complaint of chest pain. In the ER she was found to have STEMI with ST elevations in lead I and aVL and lead V2. She was given aspirin Brilinta, heparin bolus, started on heparin drip and transferred emergently to [facility] for cardiac catheterization. She was found to have acute 100% occlusion of mid to distal LAD ( ? Embolic event due to PAF versus ruptured plaque ), status post primary PCI using two drug-eluting stents with persistence of no reflow despite vasodilator therapy. 50%-60% mid PDA stenosis. PAF during PCI. Patient has persistent CP after procedure, started on colchicine. Patient will need placement      SUBJECTIVE:    Breathing is improving  No chest pain    Temp (24hrs), Av.5 °F (36.4 °C), Min:96.3 °F (35.7 °C), Max:98.1 °F (36.7 °C)    DIET: ADULT DIET; Regular; Low Fat/Low Chol/High Fiber/2 gm Na  CODE: Limited    Intake/Output Summary (Last 24 hours) at 2022 1356  Last data filed at 2022 1838  Gross per 24 hour   Intake 240 ml   Output --   Net 240 ml       Review of Systems  All bolded are positive; please see HPI  General:  Fever, chills, diaphoresis, fatigue, malaise, night sweats, weight loss  Psychological:  Anxiety, disorientation, hallucinations. ENT:  Epistaxis, headaches, vertigo, visual changes. Cardiovascular:  Chest pain, irregular heartbeats, palpitations, paroxysmal nocturnal dyspnea. Respiratory:  Shortness of breath, coughing, sputum production, hemoptysis, wheezing, orthopnea.   Gastrointestinal:  Nausea, vomiting, diarrhea, heartburn, constipation, abdominal pain, hematemesis, hematochezia, melena, acholic stools  Genito-Urinary:  Dysuria, urgency, frequency, hematuria  Musculoskeletal:  Joint pain, joint stiffness, joint swelling, muscle pain  Neurology:  Headache, focal neurological deficits, weakness, numbness, paresthesia  Derm:  Rashes, ulcers, excoriations, bruising  Extremities:  Decreased ROM, peripheral edema, mottling      OBJECTIVE:    /71   Pulse 77   Temp 97 °F (36.1 °C) (Temporal)   Resp 18   Ht 5' 5\" (1.651 m)   Wt 274 lb 1.6 oz (124.3 kg)   SpO2 100%   BMI 45.61 kg/m²     General appearance:  awake, alert, and oriented to person, place, time, and purpose; appears stated age and cooperative; no apparent distress no labored breathing 3L  HEENT:  Conjunctivae/corneas clear. Neck: Supple. No jugular venous distention. Respiratory: symmetrical; clear to auscultation bilaterally; no wheezes; no rhonchi; no rales  Cardiovascular: rhythm regular; rate controlled; no murmurs  Abdomen: Soft, nontender, nondistended  Extremities:  peripheral pulses present; no peripheral edema; no ulcers  Musculoskeletal: No clubbing, cyanosis, no bilateral lower extremity edema. Brisk capillary refill. Skin:  No rashes  on visible skin  Neurologic: awake, alert and following commands     ASSESSMENT and PLAN:    · STEMI/CAD- S/p cardiac catheterization acute 100% occlusion of mid to distal LAD ( ? Embolic event due to PAF versus ruptured plaque ), status post primary PCI using two drug-eluting stents with persistence of no reflow despite vasodilator therapy. 50%-60% mid PDA stenosis. PAF during PCI. Aspirin for one week, Plavix, eliquis, statin. Toprol and entresto to be started depending on BP and HR. Echocardiogram. Cardiac rehab. Likely needs placement. Await PTOT evals. Sed rate and CRP.    · Elevated proBNP-IV diuresis per cardiology  · Elevated sed rate/CRP- started on colchicine  · PAF- during PCI  · Hypertension  · Hyperlipidemia  · Diabetes mellitus type II  · Elevated LFTs- hold statin  · Obesity class III- BMI 48  · Fibromyalgia    Possible subacute rehab on discharge    Medications:  REVIEWED DAILY    Infusion Medications    sodium chloride       Scheduled Medications    potassium chloride  40 mEq Oral Once    metoprolol succinate  50 mg Oral BID    atorvastatin  40 mg Oral Nightly    spironolactone  25 mg Oral Daily    pregabalin  100 mg Oral TID    [Held by provider] furosemide  40 mg IntraVENous BID    colchicine  0.6 mg Oral BID    sodium chloride flush  5-40 mL IntraVENous 2 times per day    clopidogrel  75 mg Oral Daily    apixaban  5 mg Oral BID    sacubitril-valsartan  1 tablet Oral BID    pantoprazole  40 mg Oral QAM AC    rOPINIRole  2 mg Oral Nightly     PRN Meds: zolpidem, fentanNYL, LORazepam, HYDROcodone 5 mg - acetaminophen, sodium chloride flush, sodium chloride, acetaminophen, perflutren lipid microspheres, nitroGLYCERIN, [Held by provider] hydrOXYzine    Labs:     Recent Labs     05/30/22 0547 05/31/22 0927 06/01/22  0533   WBC 12.0* 9.8 6.8   HGB 9.8* 9.8* 9.5*   HCT 32.3* 32.0* 31.4*    230 228       Recent Labs     05/30/22 0547 05/31/22  0927 06/01/22  0533    137 139   K 4.2 4.0  4.0 3.3*    100 100   CO2 21* 23 26   BUN 19 21 26*   CREATININE 1.0 1.0 1.1*   CALCIUM 8.8 8.6 8.3*       Recent Labs     05/30/22 0547 05/31/22  0927 06/01/22  0533   PROT 6.3* 6.3* 5.8*   ALKPHOS 101 97 88   ALT 36* 25 21   AST 75* 45* 32*   BILITOT 0.8 0.6 0.4       No results for input(s): INR in the last 72 hours. No results for input(s): Zettie Binet in the last 72 hours. Chronic labs:    Lab Results   Component Value Date    CHOL 154 05/29/2022    TRIG 116 05/29/2022    HDL 54 05/29/2022    LDLCALC 77 05/29/2022    TSH 1.510 05/29/2022    INR 1.0 10/18/2016    LABA1C 6.1 (H) 05/29/2022       Radiology: REVIEWED DAILY    +++++++++++++++++++++++++++++++++++++++++++++++++  Jasmyne Lin MD   [place] [address], [place]  +++++++++++++++++++++++++++++++++++++++++++++++++  NOTE: This report was transcribed using voice recognition software.  Every effort was made to ensure accuracy; however, inadvertent computerized transcription errors may be present.

## 2022-06-01 NOTE — CARE COORDINATION
6/1, SW spoke with Hugh Paul from [facility] in [place]. Patient was accepted and can go under a 6 click policy that patient has with her insurance. No precert is needed. Patient will need a COVID test on day of discharge with negative results. HENS has been started but will need completed once discharge is placed. Ambulance form (PAS/Letter faxed), face sheet and envelope on soft chart. SW to follow for further discharge planning needs.       DIANA Bansal  PHYSICIANS [facility] Case Management  [phone]

## 2022-06-02 ENCOUNTER — APPOINTMENT (OUTPATIENT)
Dept: GENERAL RADIOLOGY | Age: 67
DRG: 246 | End: 2022-06-02
Attending: STUDENT IN AN ORGANIZED HEALTH CARE EDUCATION/TRAINING PROGRAM
Payer: MEDICARE

## 2022-06-02 LAB
ALBUMIN SERPL-MCNC: 3 G/DL (ref 3.5–5.2)
ALP BLD-CCNC: 92 U/L (ref 35–104)
ALT SERPL-CCNC: 20 U/L (ref 0–32)
ANION GAP SERPL CALCULATED.3IONS-SCNC: 13 MMOL/L (ref 7–16)
AST SERPL-CCNC: 28 U/L (ref 0–31)
BILIRUB SERPL-MCNC: 0.5 MG/DL (ref 0–1.2)
BUN BLDV-MCNC: 22 MG/DL (ref 6–23)
CALCIUM SERPL-MCNC: 8.7 MG/DL (ref 8.6–10.2)
CHLORIDE BLD-SCNC: 99 MMOL/L (ref 98–107)
CO2: 25 MMOL/L (ref 22–29)
CREAT SERPL-MCNC: 1 MG/DL (ref 0.5–1)
GFR AFRICAN AMERICAN: >60
GFR NON-AFRICAN AMERICAN: 55 ML/MIN/1.73
GLUCOSE BLD-MCNC: 126 MG/DL (ref 74–99)
HCT VFR BLD CALC: 32.5 % (ref 34–48)
HEMOGLOBIN: 9.7 G/DL (ref 11.5–15.5)
MCH RBC QN AUTO: 25.3 PG (ref 26–35)
MCHC RBC AUTO-ENTMCNC: 29.8 % (ref 32–34.5)
MCV RBC AUTO: 84.6 FL (ref 80–99.9)
PDW BLD-RTO: 15.6 FL (ref 11.5–15)
PLATELET # BLD: 214 E9/L (ref 130–450)
PMV BLD AUTO: 10.6 FL (ref 7–12)
POTASSIUM SERPL-SCNC: 4.3 MMOL/L (ref 3.5–5)
RBC # BLD: 3.84 E12/L (ref 3.5–5.5)
SODIUM BLD-SCNC: 137 MMOL/L (ref 132–146)
TOTAL PROTEIN: 6.2 G/DL (ref 6.4–8.3)
WBC # BLD: 5.1 E9/L (ref 4.5–11.5)

## 2022-06-02 PROCEDURE — 85027 COMPLETE CBC AUTOMATED: CPT

## 2022-06-02 PROCEDURE — 2700000000 HC OXYGEN THERAPY PER DAY

## 2022-06-02 PROCEDURE — 6370000000 HC RX 637 (ALT 250 FOR IP): Performed by: INTERNAL MEDICINE

## 2022-06-02 PROCEDURE — 2580000003 HC RX 258: Performed by: INTERNAL MEDICINE

## 2022-06-02 PROCEDURE — 6370000000 HC RX 637 (ALT 250 FOR IP): Performed by: FAMILY MEDICINE

## 2022-06-02 PROCEDURE — 2140000000 HC CCU INTERMEDIATE R&B

## 2022-06-02 PROCEDURE — 80053 COMPREHEN METABOLIC PANEL: CPT

## 2022-06-02 PROCEDURE — 97530 THERAPEUTIC ACTIVITIES: CPT

## 2022-06-02 PROCEDURE — 71046 X-RAY EXAM CHEST 2 VIEWS: CPT

## 2022-06-02 PROCEDURE — 6360000002 HC RX W HCPCS: Performed by: INTERNAL MEDICINE

## 2022-06-02 PROCEDURE — 36415 COLL VENOUS BLD VENIPUNCTURE: CPT

## 2022-06-02 PROCEDURE — 97535 SELF CARE MNGMENT TRAINING: CPT

## 2022-06-02 PROCEDURE — 94664 DEMO&/EVAL PT USE INHALER: CPT

## 2022-06-02 PROCEDURE — 99232 SBSQ HOSP IP/OBS MODERATE 35: CPT | Performed by: INTERNAL MEDICINE

## 2022-06-02 RX ORDER — IPRATROPIUM BROMIDE AND ALBUTEROL SULFATE 2.5; .5 MG/3ML; MG/3ML
1 SOLUTION RESPIRATORY (INHALATION)
Status: DISCONTINUED | OUTPATIENT
Start: 2022-06-02 | End: 2022-06-06 | Stop reason: HOSPADM

## 2022-06-02 RX ORDER — LIDOCAINE 4 G/G
1 PATCH TOPICAL DAILY
Status: DISCONTINUED | OUTPATIENT
Start: 2022-06-02 | End: 2022-06-06 | Stop reason: HOSPADM

## 2022-06-02 RX ADMIN — SODIUM CHLORIDE, PRESERVATIVE FREE 10 ML: 5 INJECTION INTRAVENOUS at 21:50

## 2022-06-02 RX ADMIN — SACUBITRIL AND VALSARTAN 1 TABLET: 24; 26 TABLET, FILM COATED ORAL at 09:01

## 2022-06-02 RX ADMIN — CLOPIDOGREL BISULFATE 75 MG: 75 TABLET ORAL at 09:01

## 2022-06-02 RX ADMIN — PREGABALIN 100 MG: 100 CAPSULE ORAL at 09:02

## 2022-06-02 RX ADMIN — COLCHICINE 0.6 MG: 0.6 TABLET ORAL at 21:52

## 2022-06-02 RX ADMIN — ACETAMINOPHEN 650 MG: 325 TABLET ORAL at 21:52

## 2022-06-02 RX ADMIN — ROPINIROLE HYDROCHLORIDE 2 MG: 2 TABLET, FILM COATED ORAL at 21:51

## 2022-06-02 RX ADMIN — SPIRONOLACTONE 25 MG: 25 TABLET ORAL at 09:02

## 2022-06-02 RX ADMIN — COLCHICINE 0.6 MG: 0.6 TABLET ORAL at 09:01

## 2022-06-02 RX ADMIN — IPRATROPIUM BROMIDE AND ALBUTEROL SULFATE 1 AMPULE: .5; 2.5 SOLUTION RESPIRATORY (INHALATION) at 19:19

## 2022-06-02 RX ADMIN — PREGABALIN 100 MG: 100 CAPSULE ORAL at 21:52

## 2022-06-02 RX ADMIN — PANTOPRAZOLE SODIUM 40 MG: 40 TABLET, DELAYED RELEASE ORAL at 05:46

## 2022-06-02 RX ADMIN — FUROSEMIDE 40 MG: 10 INJECTION, SOLUTION INTRAMUSCULAR; INTRAVENOUS at 09:02

## 2022-06-02 RX ADMIN — ACETAMINOPHEN 650 MG: 325 TABLET ORAL at 09:01

## 2022-06-02 RX ADMIN — PREGABALIN 100 MG: 100 CAPSULE ORAL at 14:47

## 2022-06-02 RX ADMIN — APIXABAN 5 MG: 5 TABLET, FILM COATED ORAL at 21:53

## 2022-06-02 RX ADMIN — SODIUM CHLORIDE, PRESERVATIVE FREE 10 ML: 5 INJECTION INTRAVENOUS at 09:02

## 2022-06-02 RX ADMIN — ACETAMINOPHEN 650 MG: 325 TABLET ORAL at 14:47

## 2022-06-02 RX ADMIN — ZOLPIDEM TARTRATE 5 MG: 5 TABLET ORAL at 21:52

## 2022-06-02 RX ADMIN — FUROSEMIDE 40 MG: 10 INJECTION, SOLUTION INTRAMUSCULAR; INTRAVENOUS at 18:22

## 2022-06-02 RX ADMIN — ATORVASTATIN CALCIUM 40 MG: 40 TABLET, FILM COATED ORAL at 21:53

## 2022-06-02 RX ADMIN — METOPROLOL SUCCINATE 50 MG: 50 TABLET, EXTENDED RELEASE ORAL at 09:01

## 2022-06-02 RX ADMIN — APIXABAN 5 MG: 5 TABLET, FILM COATED ORAL at 09:01

## 2022-06-02 ASSESSMENT — PAIN DESCRIPTION - DESCRIPTORS
DESCRIPTORS: ACHING;DISCOMFORT;NAGGING
DESCRIPTORS: DISCOMFORT;ACHING;HEAVINESS
DESCRIPTORS: ACHING;DISCOMFORT;HEAVINESS
DESCRIPTORS: ACHING;HEAVINESS;DISCOMFORT
DESCRIPTORS: ACHING;DISCOMFORT;NAGGING

## 2022-06-02 ASSESSMENT — PAIN - FUNCTIONAL ASSESSMENT
PAIN_FUNCTIONAL_ASSESSMENT: ACTIVITIES ARE NOT PREVENTED

## 2022-06-02 ASSESSMENT — PAIN DESCRIPTION - LOCATION
LOCATION: CHEST
LOCATION: BACK
LOCATION: CHEST
LOCATION: BACK
LOCATION: CHEST

## 2022-06-02 ASSESSMENT — PAIN DESCRIPTION - PAIN TYPE
TYPE: CHRONIC PAIN
TYPE: ACUTE PAIN

## 2022-06-02 ASSESSMENT — PAIN SCALES - GENERAL
PAINLEVEL_OUTOF10: 6
PAINLEVEL_OUTOF10: 6
PAINLEVEL_OUTOF10: 3
PAINLEVEL_OUTOF10: 3
PAINLEVEL_OUTOF10: 0
PAINLEVEL_OUTOF10: 0
PAINLEVEL_OUTOF10: 6
PAINLEVEL_OUTOF10: 4
PAINLEVEL_OUTOF10: 3
PAINLEVEL_OUTOF10: 6

## 2022-06-02 ASSESSMENT — PAIN DESCRIPTION - FREQUENCY
FREQUENCY: CONTINUOUS

## 2022-06-02 ASSESSMENT — PAIN DESCRIPTION - ORIENTATION
ORIENTATION: LEFT
ORIENTATION: LOWER
ORIENTATION: LOWER
ORIENTATION: LEFT
ORIENTATION: LEFT

## 2022-06-02 ASSESSMENT — PAIN DESCRIPTION - ONSET
ONSET: ON-GOING

## 2022-06-02 NOTE — PLAN OF CARE
Problem: Discharge Planning  Goal: Discharge to home or other facility with appropriate resources  Outcome: Progressing  Flowsheets (Taken 6/1/2022 2000)  Discharge to home or other facility with appropriate resources: Identify barriers to discharge with patient and caregiver     Problem: Chronic Conditions and Co-morbidities  Goal: Patient's chronic conditions and co-morbidity symptoms are monitored and maintained or improved  Outcome: Progressing  Flowsheets (Taken 6/1/2022 2000)  Care Plan - Patient's Chronic Conditions and Co-Morbidity Symptoms are Monitored and Maintained or Improved: Monitor and assess patient's chronic conditions and comorbid symptoms for stability, deterioration, or improvement     Problem: Safety - Adult  Goal: Free from fall injury  Outcome: Progressing  Flowsheets (Taken 6/2/2022 0116)  Free From Fall Injury: Instruct family/caregiver on patient safety

## 2022-06-02 NOTE — PROGRESS NOTES
Hospitalist Progress Note      SYNOPSIS: Patient admitted on 2022 for STEMI (ST elevation myocardial infarction) [facility])  presented to [facility] ER with complaint of chest pain. In the ER she was found to have STEMI with ST elevations in lead I and aVL and lead V2. She was given aspirin Brilinta, heparin bolus, started on heparin drip and transferred emergently to [facility] for cardiac catheterization. She was found to have acute 100% occlusion of mid to distal LAD ( ? Embolic event due to PAF versus ruptured plaque ), status post primary PCI using two drug-eluting stents with persistence of no reflow despite vasodilator therapy. 50%-60% mid PDA stenosis. PAF during PCI. Patient has persistent CP after procedure, started on colchicine. Patient will need placement      SUBJECTIVE:    SOB is the same as yesterday; had CP for hour today and last night; stabbing. Patient states that nitro helped decrease the pain. Cough, slightly productive. Insomnia. +Back pain. No dysuria. +diarrhea since the cath. Temp (24hrs), Av °F (36.7 °C), Min:97.8 °F (36.6 °C), Max:98.2 °F (36.8 °C)    DIET: ADULT DIET; Regular; Low Fat/Low Chol/High Fiber/2 gm Na  CODE: Limited    Intake/Output Summary (Last 24 hours) at 2022 1655  Last data filed at 2022 1648  Gross per 24 hour   Intake 150 ml   Output 725 ml   Net -575 ml         OBJECTIVE:    BP (!) 148/88   Pulse 85   Temp 98 °F (36.7 °C) (Temporal)   Resp 18   Ht 5' 5\" (1.651 m)   Wt 273 lb (123.8 kg)   SpO2 96%   BMI 45.43 kg/m²     General appearance:  awake, alert, and oriented to person, place, time, and purpose; appears stated age and cooperative; no apparent distress no labored breathing 2L  HEENT:  Conjunctivae/corneas clear. Neck: Supple. No jugular venous distention.    Respiratory: Diminished breath sounds (b/l)   Cardiovascular: rhythm regular; rate controlled; no murmurs  Abdomen: Soft, nontender, nondistended  Extremities: peripheral pulses present; no peripheral edema; no ulcers  Musculoskeletal: No clubbing, cyanosis, trace edema (b/l)   Skin:  No rashes  on visible skin  Neurologic: awake, alert and following commands     ASSESSMENT and PLAN:    1.) STEMI/CAD- S/p cardiac catheterization acute 100% occlusion of mid to distal LAD ( ? Embolic event due to PAF versus ruptured plaque ), status post primary PCI using two drug-eluting stents with persistence of no reflow despite vasodilator therapy. 50%-60% mid PDA stenosis. PAF during PCI. Aspirin for one week, Plavix, eliquis, statin. Toprol and entresto to be started depending on BP and HR. Echocardiogram. Cardiac rehab. Likely needs placement. Await PTOT evals. Sed rate and CRP. 2.) Elevated proBNP-IV diuresis per cardiology (40mg IV BID). 3.)  Elevated sed rate/CRP- started on colchicine  4.) PAF- during PCI  5.) Hypertension: controlled. 6.) Hyperlipidemia: on hold; f/u the labs in the am.   7.) Diabetes mellitus type II: controlled. 8.) Elevated LFTs- hold statin  9.) Obesity class III- BMI 48  10.) Fibromyalgia  11.) SOB: Will get a 2 view chest xray; patient with asthma Duonebs Q6Hrs while awake. Flutter valve orfered. 12.) Back pain: lidoderm patch ordered. Analgesics PRN.      Possible subacute rehab on discharge    Medications:  REVIEWED DAILY    Infusion Medications    sodium chloride       Scheduled Medications    lidocaine  1 patch TransDERmal Daily    metoprolol succinate  50 mg Oral BID    atorvastatin  40 mg Oral Nightly    spironolactone  25 mg Oral Daily    pregabalin  100 mg Oral TID    furosemide  40 mg IntraVENous BID    colchicine  0.6 mg Oral BID    sodium chloride flush  5-40 mL IntraVENous 2 times per day    clopidogrel  75 mg Oral Daily    apixaban  5 mg Oral BID    sacubitril-valsartan  1 tablet Oral BID    pantoprazole  40 mg Oral QAM AC    rOPINIRole  2 mg Oral Nightly     PRN Meds: zolpidem, fentanNYL, LORazepam, HYDROcodone 5 mg - acetaminophen, sodium chloride flush, sodium chloride, acetaminophen, perflutren lipid microspheres, nitroGLYCERIN, [Held by provider] hydrOXYzine    Labs:     Recent Labs     05/31/22 0927 06/01/22  0533 06/02/22  0549   WBC 9.8 6.8 5.1   HGB 9.8* 9.5* 9.7*   HCT 32.0* 31.4* 32.5*    228 214       Recent Labs     05/31/22 0927 06/01/22 0533 06/02/22  0549    139 137   K 4.0  4.0 3.3* 4.3    100 99   CO2 23 26 25   BUN 21 26* 22   CREATININE 1.0 1.1* 1.0   CALCIUM 8.6 8.3* 8.7       Recent Labs     05/31/22 0927 06/01/22  0533 06/02/22  0549   PROT 6.3* 5.8* 6.2*   ALKPHOS 97 88 92   ALT 25 21 20   AST 45* 32* 28   BILITOT 0.6 0.4 0.5       No results for input(s): INR in the last 72 hours. No results for input(s): Margette Dec in the last 72 hours. Chronic labs:    Lab Results   Component Value Date    CHOL 154 05/29/2022    TRIG 116 05/29/2022    HDL 54 05/29/2022    LDLCALC 77 05/29/2022    TSH 1.510 05/29/2022    INR 1.0 10/18/2016    LABA1C 6.1 (H) 05/29/2022       Radiology: REVIEWED DAILY    +++++++++++++++++++++++++++++++++++++++++++++++++  Nicola Hilliard MD   [facility] Physician - [address] [facility], [facility]  +++++++++++++++++++++++++++++++++++++++++++++++++  NOTE: This report was transcribed using voice recognition software. Every effort was made to ensure accuracy; however, inadvertent computerized transcription errors may be present.

## 2022-06-02 NOTE — PROGRESS NOTES
Physical Therapy  Physical Therapy Rx     Name: Jaden Wood  : 1955  MRN: [id number]      Date of Service: 2022    Evaluating PT:  Magui Lewis, PT, DPT [id number]    Room #:  1274/4138-E  Diagnosis:  STEMI (ST elevation myocardial infarction) [facility]) [I21.3]  PMHx/PSHx:  See chart  Procedure/Surgery:   LHC, PCI  Precautions:  Falls, O2  Equipment Needs:  TBD    SUBJECTIVE:    Pt lives alone in a 2 story home, 1st floor setup with \"a couple\" stairs to enter and 1 rail. Pt ambulated with SPC and was independent PTA. OBJECTIVE:   Initial Evaluation  Date: 22 Treatment   Short Term/ Long Term   Goals   AM-PAC 6 Clicks  89/58     Was pt agreeable to Eval/treatment? Yes Yes     Does pt have pain? No c/o pain Chest pain/nipple pain     Bed Mobility  Rolling: NT  Supine to sit: SBA with HOB elevated  Sit to supine: NT  Scooting: SBA   Supine to sit sba   Sit to supine sba   Scooting sba  Mod Independent   Transfers Sit to stand: Christy elevated surfaces  Stand to sit: Christy  Stand pivot: Christy with [employer] Sit to stand  Bed to ww sba  Stand to sit sba  ww to bed  Mod Independent with [employer]   Ambulation   50 feet with Christy with WW 45 and 4  feet with ww sba/cga   >150 feet with Mod Independent with [employer]   Stair negotiation: ascended and descended NT NT >4 steps with 1 rail Mod Independent   ROM BUE:  WFL  BLE:  WFL     Strength BUE:  WFL  BLE:  4/5  Increase by 1/3 MMT grade   Balance Sitting EOB:  SBA  Dynamic Standing:  Christy with [employer] Sitting eob s  Dynamic standing   cga  Sitting EOB:  Independent  Dynamic Standing: Mod Independent with [employer]       Pt is A & O x  3  Edema:  na      Therapeutic Exercises: ankle pumps, function    Patient education  Pt educated on importance of mobility     Patient response to education:   Pt verbalized understanding Pt demonstrated skill Pt requires further education in this area   x x X      ASSESSMENT:    Comments:   Pt in bed reports that she is cold.     Pt reports chest pain and nipple pain. Notified nursing of the above and she reports pt ok for therapy and she will be in, in a little bit. Pt performed function as per above. Pt labored with all activity. Pt ambulates with flexed posture, slow gait and cues for 02 line management. Tried for some time to get pulse ox and not able. Staff reports difficult to get pulse ox. Pt fatigued after ambulation   Educated pt on doing ankle pumps when up in the chair. Pt reports that she has been up in chair earlier today. Treatment:  Patient practiced and was instructed in the following treatment:     Bed mobility    Transfers   gait        PLAN:    Patient is making  progress towards established goals. Will continue with current POC.       Time in  203  Time out  225     Total Treatment Time  23 minutes       CPT codes:  [x] Therapeutic activities 35414 23 minutes  [] Therapeutic exercises 11080 0 minutes      Tonja Ozuna, [address]

## 2022-06-02 NOTE — CARE COORDINATION
6/2, AIMEE spoke with Callie from [facility] in [place] on patient not ready for discharge today. Patient is a 6 click insurance. Will need PT/OT updates which therapy was called on. PT/OT updates will be good for the next 24 hours if done today. If patient still meets 6 click criteria no precert will be needed. HENS has been started but will need completed once discharge is placed. Patient will need a negative COVID test on day of discharge. Ambulance form, faxe sheet and envelope on soft chart. AIMEE to follow for further discharge planning needs.       Unknown [facility] Case Management  [phone]

## 2022-06-02 NOTE — CARE COORDINATION
6/2, SW met with patient at bedside to discuss discharge plan which remains to [facility] in [place] when medically cleared. Contacted Rain from facility and confirmed that patient would have a bed and asked questions that patient had on the facility. Cardiac Defibrillator consult on chart. SW to follow for further discharge planning needs.       Veena Llamas [facility] Case Management  [phone]

## 2022-06-02 NOTE — PROGRESS NOTES
PROGRESS NOTE     CARDIOLOGY    Chief complaint: Seen today for follow up, management & recommendations for coronary artery disease, ischemic cardiomyopathy    She was sitting on a bedside commode at the side of bed. She states that she is still fatigued. She feels that her breathing is improving. She states that she has ambulated in the halls. Wt Readings from Last 3 Encounters:   06/02/22 273 lb (123.8 kg)     Temp Readings from Last 3 Encounters:   06/02/22 97.9 °F (36.6 °C) (Temporal)     BP Readings from Last 3 Encounters:   06/02/22 114/82     Pulse Readings from Last 3 Encounters:   06/02/22 87         Intake/Output Summary (Last 24 hours) at 6/2/2022 0856  Last data filed at 6/1/2022 2148  Gross per 24 hour   Intake 330 ml   Output 625 ml   Net -295 ml       Recent Labs     05/31/22  0927 06/01/22  0533 06/02/22  0549   WBC 9.8 6.8 5.1   HGB 9.8* 9.5* 9.7*   HCT 32.0* 31.4* 32.5*   MCV 83.8 84.2 84.6    228 214     Recent Labs     05/31/22  0927 06/01/22  0533 06/02/22  0549    139 137   K 4.0  4.0 3.3* 4.3    100 99   CO2 23 26 25   BUN 21 26* 22   CREATININE 1.0 1.1* 1.0     No results for input(s): PROTIME, INR in the last 72 hours. No results for input(s): CKTOTAL, CKMB, CKMBINDEX, TROPONINI in the last 72 hours. No results for input(s): BNP in the last 72 hours. No results for input(s): CHOL, HDL, TRIG in the last 72 hours. Invalid input(s): CHOLHDLR, LDLCALCU  No results for input(s): TROPHS in the last 72 hours.       zolpidem (AMBIEN) tablet 5 mg, Nightly PRN  metoprolol succinate (TOPROL XL) extended release tablet 50 mg, BID  atorvastatin (LIPITOR) tablet 40 mg, Nightly  spironolactone (ALDACTONE) tablet 25 mg, Daily  fentaNYL (SUBLIMAZE) injection 50 mcg, Q2H PRN  LORazepam (ATIVAN) tablet 0.5 mg, Q8H PRN  HYDROcodone-acetaminophen (NORCO) 5-325 MG per tablet 1 tablet, Q6H PRN  pregabalin (LYRICA) capsule 100 mg, TID  furosemide (LASIX) injection 40 mg, BID  colchicine (COLCRYS) tablet 0.6 mg, BID  sodium chloride flush 0.9 % injection 5-40 mL, 2 times per day  sodium chloride flush 0.9 % injection 5-40 mL, PRN  0.9 % sodium chloride infusion, PRN  acetaminophen (TYLENOL) tablet 650 mg, Q4H PRN  clopidogrel (PLAVIX) tablet 75 mg, Daily  apixaban (ELIQUIS) tablet 5 mg, BID  perflutren lipid microspheres (DEFINITY) injection 1.65 mg, ONCE PRN  sacubitril-valsartan (ENTRESTO) 24-26 MG per tablet 1 tablet, BID  nitroGLYCERIN (NITROSTAT) SL tablet 0.4 mg, Q5 Min PRN  [Held by provider] hydrOXYzine (VISTARIL) capsule 50 mg, TID PRN  pantoprazole (PROTONIX) tablet 40 mg, QAM AC  rOPINIRole (REQUIP) tablet 2 mg, Nightly        Review of systems:     Heart: as above   Lungs: as above   Eyes: denies changes in vision or discharge. Ears: denies changes in hearing or pain. Nose: denies epistaxis or masses   Throat: denies sore throat or trouble swallowing. Neuro: denies numbness, tingling, tremors. Skin: denies rashes or itching. : denies hematuria, dysuria   GI: denies vomiting, diarrhea   Psych: denies mood changed, anxiety, depression. Physical exam:    Constitutional: A&O x3, communicates well, no acute distress. Eyes: extraocular muscles intact, PERRL. Normal lids & conjunctiva. No icterus. ENT: clear, no bleeding. No external masses. Lips normal formation. Neck: Thick, multiple chins. Difficult to examine. Supple, full ROM. Difficult to examine for JVD, no bruits, no lymphadenopathy. No masses. trachea midline. Heart: Very distant. Regular rate & rhythm, normal S1 & S2, no abnormal murmurs. No heave. Lungs: Poor air movement. Difficult exam.  bibasilar rales. No accessory muscles. Abd: Super morbidly obese soft, non-tender. Normal bowel sounds. Neuro: Full ROM X 4, EOMI, no tremors. EXT: Trace bilateral lower extremity edema on exam  Skin: warm, dry, intact. Good turgor.   Psych: A&O x 3, normal behavior, not anxious. Assessment/Recommendations  1. Anterior STEMI. Post MI chest pain being treated for pericarditis. No chest pain again today. 2. Coronary artery disease. 2 drug-eluting stents to the mid to distal LAD complicated by no reflow. 3. Ischemic cardiomyopathy. Very difficult exam hypervolemic. Chest x-ray shows a small pleural effusion as well. Continue IV diuresis. Follow lites and creatinine. Increase activities. 4. Paroxysmal atrial fibrillation. In sinus today. On Eliquis. 5. Hypertension  6. Hypercholesterolemia  7. Diabetes  8. Prior TIA  9. Restless leg syndrome  10. Fibromyalgia  11. Morbid obesity    Note: This report was completed using computerized voice recognition software. Every effort has been made to ensure accuracy, however; and invert and computerized transcription errors may be present.

## 2022-06-02 NOTE — PROGRESS NOTES
Occupational Therapy  OT BEDSIDE TREATMENT NOTE   [facility] [address] [facility]  [address] [place] [address]  Patient Name: Seven Booth  MRN: [id number]  : 1955  Room: [address] [place] [address]     Per OT Eval:    Evaluating OT:Ashly Cummings OTR/ELOISA   License #  [id number]        Referring Provider: Joanna Aquino DO    Specific Provider Orders/Date: OT evaluation & treatment        Diagnosis:  STEMI    Pertinent Medical History:  has a past medical history of Anxiety, Arthritis, Asthma, Depression, Diabetes mellitus ([facility] [address] Fibromyalgia, Hyperlipidemia, Hypertension, MI (myocardial infarction) ([facility] [address] Restless leg syndrome, and TIA (transient ischemic attack). [id number]: [birth year]:  Emergency left heart catheterization and primary PCI    Past Surgical History:  has a past surgical history that includes Cholecystectomy; joint replacement; Cardiac catheterization; Breast surgery; and Coronary angioplasty with stent (2022).      Precautions:  Fall Risk, 2L O2 via NC      Assessment of current deficits    [x]? ? Functional mobility            [x]? ?ADLs           [x]? ? Strength                  [x]? ? Cognition    [x]? ? Functional transfers          [x]? ? IADLs         [x]? ? Safety Awareness   [x]? ?Endurance    [x]? ? Fine Coordination                         [x]? ? Balance      []? ? Vision/perception   [x]? ? Sensation      []? ?Gross Motor Coordination             []? ? ROM           []? ? Delirium                   []? ? Motor Control      OT PLAN OF CARE   OT POC based on physician orders, patient diagnosis and results of clinical assessment     Frequency/Duration: 2-4 days/wk for 2 weeks PRN   Specific OT Treatment Interventions to include:   Instruction/training on adapted ADL techniques and AE recommendations to increase functional independence within precautions  Training on energy conservation strategies, correct breathing pattern and techniques to improve independence/tolerance for self-care routine  Functional transfer/mobility training/DME recommendations for increased independence, safety, and fall prevention  Patient/Family education to increase follow through with safety techniques and functional independence  Recommendation of environmental modifications for increased safety with functional transfers/mobility and ADLs  Therapeutic exercise to improve motor endurance, ROM, and functional strength for ADLs/functional transfers  Therapeutic activities to facilitate/challenge dynamic balance, stand tolerance for increased safety and independence with ADLs  Therapeutic activities to facilitate gross/fine motor skills for increased independence with ADLs  Positioning to improve skin integrity, interaction with environment and functional independence     Recommended Adaptive Equipment:  TBD for LB ADL A.E., A.D. per PT     Home Living: Pt lives alone in a 2 story with 2 steps to enter with 1 HR.  B&B on main level. Bathroom setup: tub/shower, std. commode   Equipment owned: spc, shower chair     Prior Level of Function: Pt. states Ind. with ADLs & Ind. with IADLs but getting more difficult as of late; ambulated spc.    Driving: active  Occupation: retired cardiac technician     Pain Level: Pt complained of back pain this session  Cognition: A&O: 4/4; Follows multi- step directions              Memory:  good              Sequencing:  good              Problem solving:  good              Judgement/safety:  good                Functional Assessment:  AM-PAC Daily Activity Raw Score: 19/24    Initial Eval Status  Date: 5-31-22 Treatment Status  Date: 6/2/22 STGs = LTGs  Time frame: 10-14 days   Feeding Ind.  Independent Mod I/ Ind   Grooming Set up  Setup  Pt washed face, applied deodorant seated Modified [place]    UB Dressing SBA DNT  SBA per previous level  Modified [place]    LB Dressing Min A with figure 4 technique seated EOB  SBA-socks  Pt donned/doffed hospital socks using cross over technique    DNT pants this date Modified [place]    Bathing Min A with sim. task  Jh  Simulated Task    Pt able to wash of UB, using cross over technique to wash of LB while seated, assistance to stand and wash of buttocks/gita area Modified [place]    Toileting Min A/SBA hygiene  Jh  Pt completed of toileting task on BSC, pt able to complete of transfer, assistance with hygiene to buttocks Modified [place]    Bed Mobility  Supine to sit: SBA  Sit to supine: NT  DNT  SBA per previous level    Pt seated upright in chair upon arrival and at end of session Supine to sit: Modified [place]   Sit to supine: Modified [place]    Functional Transfers Min A with sit <> stand, SPT with ww  CGA  Sit to Stand  Stand to Sit    Cueing for hand placement Modified [place]    Functional Mobility Min A with ww functional home distances [address] [facility]  Pt ambulated short household distance in room and within johnson per pt's request with w.w., slow pace Modified [place]    Balance Sitting:     Static:  Sup    Dynamic:SBA  Standing: Min A Sitting:  Independent    Standing:  CGA      Activity Tolerance Fair-   Noted min.  SOB with ax.  Fair-  Easily fatigued and SOB with activity/mobiltiy Fair+/good   Visual/  Perceptual Glasses: yes          Vitals spO2 97% on 2L  HR 82 after ax.   wfl      Hand Dominance R    AROM (PROM) Strength Additional Info:    RUE  WFL Grossly 4/5 good  and wfl FMC/dexterity noted during ADL tasks      LUE WFL Grossly 4/5 good  and wfl FMC/dexterity noted during ADL tasks         Hearing: WFL   Sensation:  No c/o numbness or tingling B fingertips occasionally  Tone: WFL B UE  Edema: none noted B UE           Education:  Pt was educated on role of OT, goals to be reached, importance of OOB activity, safety and hand placement with transfers, safety and walker management with functional mobility, techniques to assist with LB dressing/bathing tasks, energy conservation and deep breathing techniques. Comments: Upon arrival pt seated upright in chair, agreeable to therapy, speaking with nursing okaying pt to be seen this session. Pt completed of transfers, functional mobility and ADL tasks this session. Pt also completed of BUE's exercises for 5 reps in several planes, with rest breaks, educationg pt on importance of increasing of ROM and preventing of contractures, encouraging pt to complete daily. Pt easily fatigued, providing of rest breaks as needed. At end of session, pt seated upright in chair, BLE's elevated, all lines and tubes intact, call light within reach. · Pt has made fair progress towards set goals.    · Continue with current plan of care focusing on increasing of independency with transfers and ADL tasks      Treatment Time In: 10:21am          Treatment Time Out: 10:46am               Treatment Charges: Mins Units   Ther Ex  [id number]     Manual Therapy [id number]     Thera Activities 54985 15 1   ADL/Home Mgt 91617 10 1   Neuro Re-ed 00024     Group Therapy      Orthotic manage/training  35179     Non-Billable Time     Total Timed Treatment 25 2        Tiffany Hooper CRAVEN/L [id number]

## 2022-06-02 NOTE — FLOWSHEET NOTE
06/02/22 9932   Encounter Summary   Encounter Overview/Reason  Spiritual/Emotional Needs   Service Provided For: Patient   Referral/Consult From: [facility]   Support System Friends/neighbors   Last Encounter  06/02/22  (DL)   Complexity of Encounter High   Spiritual/Emotional needs   Type Spiritual Support   Grief, Loss, and Adjustments   Type New Diagnosis; Life Adjustments   Advance Care Planning   Type ACP conversation   Assessment/Intervention/Outcome   Assessment Coping;Fearful;Loneliness   Intervention Active listening;Discussed meaning/purpose;Discussed belief system/Congregational practices/bereket;Discussed death, afterlife; Discussed illness injury and its impact;Prayer (assurance of)/Martin;Nurtured Hope;Discussed relationship with God;Explored/Affirmed feelings, thoughts, concerns   Outcome Comfort;Expressed feelings, needs, and concerns;Expressed Gratitude     Glorious Hiss is making medical decision due to her new heart issues. She already had living will/POA but not on fill. I updated decision makers and placed copy in her chart on the unit. Much emotional and spiritual support provided.

## 2022-06-02 NOTE — ACP (ADVANCE CARE PLANNING)
Advance Care Planning   Advance Care Planning Note  Ambulatory Spiritual Care Services    Date:  5/28/2022    Received request from patient. Consultation conversation participants:   Patient who understands ACP conversation     Goals of ACP Conversation:  Discuss advance care planning documents    Health Care Decision Makers:      Primary Decision Maker: Michael Flores Other - [phone]    Secondary Decision Maker: Aleyda Cantu Robb Other - [phone]     Summary:  Verified [address] Planning Documents (Patient Wishes)  Currently on file:   None    Assessment:   Karely Franklin is making medical decision due to her new heart issues. She already had living will/POA but not on fill. I updated decision makers and placed copy in her chart on the unit. Much emotional and spiritual support provided. Interventions:  Provided education on documents for clarity and greater understanding  Encouraged ongoing ACP conversation with future decision makers and loved ones    Care Preferences Communicated:     Hospitalization:  If the patient's health worsens and it becomes clear that the chance of recovery is unlikely,     the patient wants hospitalization. Ventilation:   If the patient, in their present state of health, suddenly became very ill and unable to breathe on their own,     the patient would NOT desire the use of a ventilator (breathing machine). If their health worsens and it becomes clear that the change of recovery is unlikely,     the patient would NOT desire the use of a ventilator (breathing machine). Resuscitation:  In the event the patient's heart stopped as a result of an underlying serious health condition, the patient communicates a preference for      a natural death (no CPR). Outcomes:  Copy of advance directive given to staff to scan into medical record.     Patient / Cassandra Martin Instructions:  Review completed ACP document(s) and update, if needed, with changes health or future preferences    Electronically signed by Murtaza Gibbons [facility] on 6/2/2022 at 9:26 AM.

## 2022-06-02 NOTE — PATIENT CARE CONFERENCE
P Quality Flow/Interdisciplinary Rounds Progress Note        Quality Flow Rounds held on June 2, 2022    Disciplines Attending:  Bedside Nurse, ,  and Nursing Unit Leadership    Carol Ulloa was admitted on 5/28/2022  5:25 AM    Anticipated Discharge Date:       Disposition:    Willian Score:  Willian Scale Score: 21    Readmission Risk              Risk of Unplanned Readmission:  10           Discussed patient goal for the day, patient clinical progression, and barriers to discharge.   The following Goal(s) of the Day/Commitment(s) have been identified:  Labs - Report Results      Bang Hidalgo RN  June 2, 2022

## 2022-06-02 NOTE — PLAN OF CARE
Problem: Discharge Planning  Goal: Discharge to home or other facility with appropriate resources  6/2/2022 1828 by Derrek Farrell RN  Outcome: Progressing  Flowsheets (Taken 6/2/2022 1600)  Discharge to home or other facility with appropriate resources:   Identify barriers to discharge with patient and caregiver   Arrange for needed discharge resources and transportation as appropriate  6/2/2022 0943 by Derrek Farrell RN  Outcome: Progressing  Flowsheets (Taken 6/2/2022 6596)  Discharge to home or other facility with appropriate resources:   Identify barriers to discharge with patient and caregiver   Arrange for needed discharge resources and transportation as appropriate     Problem: Chronic Conditions and Co-morbidities  Goal: Patient's chronic conditions and co-morbidity symptoms are monitored and maintained or improved  6/2/2022 1828 by Derrek Farrell RN  Outcome: Progressing  Flowsheets (Taken 6/2/2022 1600)  Care Plan - Patient's Chronic Conditions and Co-Morbidity Symptoms are Monitored and Maintained or Improved:   Monitor and assess patient's chronic conditions and comorbid symptoms for stability, deterioration, or improvement   Collaborate with multidisciplinary team to address chronic and comorbid conditions and prevent exacerbation or deterioration  6/2/2022 0943 by Derrek Farrell RN  Outcome: Progressing  Flowsheets (Taken 6/2/2022 0854)  Care Plan - Patient's Chronic Conditions and Co-Morbidity Symptoms are Monitored and Maintained or Improved:   Monitor and assess patient's chronic conditions and comorbid symptoms for stability, deterioration, or improvement   Collaborate with multidisciplinary team to address chronic and comorbid conditions and prevent exacerbation or deterioration     Problem: Safety - Adult  Goal: Free from fall injury  6/2/2022 1828 by Derrek Farrell RN  Outcome: Progressing  6/2/2022 0943 by Derrek Farrell RN  Outcome: Progressing  Flowsheets (Taken 6/2/2022 0854)  Free From Fall Injury: Instruct family/caregiver on patient safety     Problem: ABCDS Injury Assessment  Goal: Absence of physical injury  6/2/2022 1828 by Arsenio Saldivar RN  Outcome: Progressing  6/2/2022 0943 by Arsenio Saldivar RN  Outcome: Progressing  Flowsheets  Taken 6/2/2022 0854 by Arsenio Saldivar RN  Absence of Physical Injury: Implement safety measures based on patient assessment  Taken 6/2/2022 0116 by Lala Mejia RN  Absence of Physical Injury: Implement safety measures based on patient assessment     Problem: Pain  Goal: Verbalizes/displays adequate comfort level or baseline comfort level  6/2/2022 1828 by Arsenio Saldivar RN  Outcome: Progressing  Flowsheets  Taken 6/2/2022 1432  Verbalizes/displays adequate comfort level or baseline comfort level:   Encourage patient to monitor pain and request assistance   Assess pain using appropriate pain scale  Taken 6/2/2022 1110  Verbalizes/displays adequate comfort level or baseline comfort level:   Encourage patient to monitor pain and request assistance   Assess pain using appropriate pain scale  6/2/2022 0943 by Arsenio Saldivar RN  Outcome: Progressing  Flowsheets  Taken 6/2/2022 0854 by Arsenio Saldivar RN  Verbalizes/displays adequate comfort level or baseline comfort level:   Encourage patient to monitor pain and request assistance   Assess pain using appropriate pain scale  Taken 6/1/2022 2000 by Lala Mejia RN  Verbalizes/displays adequate comfort level or baseline comfort level: Encourage patient to monitor pain and request assistance

## 2022-06-03 LAB
ALBUMIN SERPL-MCNC: 3 G/DL (ref 3.5–5.2)
ALP BLD-CCNC: 82 U/L (ref 35–104)
ALT SERPL-CCNC: 15 U/L (ref 0–32)
ANION GAP SERPL CALCULATED.3IONS-SCNC: 12 MMOL/L (ref 7–16)
AST SERPL-CCNC: 25 U/L (ref 0–31)
BILIRUB SERPL-MCNC: 0.3 MG/DL (ref 0–1.2)
BUN BLDV-MCNC: 18 MG/DL (ref 6–23)
CALCIUM SERPL-MCNC: 8.5 MG/DL (ref 8.6–10.2)
CHLORIDE BLD-SCNC: 98 MMOL/L (ref 98–107)
CO2: 28 MMOL/L (ref 22–29)
CREAT SERPL-MCNC: 1 MG/DL (ref 0.5–1)
GFR AFRICAN AMERICAN: >60
GFR NON-AFRICAN AMERICAN: 55 ML/MIN/1.73
GLUCOSE BLD-MCNC: 114 MG/DL (ref 74–99)
HCT VFR BLD CALC: 31.4 % (ref 34–48)
HEMOGLOBIN: 9.5 G/DL (ref 11.5–15.5)
MAGNESIUM: 1.8 MG/DL (ref 1.6–2.6)
MCH RBC QN AUTO: 25.5 PG (ref 26–35)
MCHC RBC AUTO-ENTMCNC: 30.3 % (ref 32–34.5)
MCV RBC AUTO: 84.4 FL (ref 80–99.9)
PDW BLD-RTO: 15.4 FL (ref 11.5–15)
PHOSPHORUS: 3.1 MG/DL (ref 2.5–4.5)
PLATELET # BLD: 181 E9/L (ref 130–450)
PMV BLD AUTO: 10.6 FL (ref 7–12)
POTASSIUM SERPL-SCNC: 3.9 MMOL/L (ref 3.5–5)
PRO-BNP: ABNORMAL PG/ML (ref 0–125)
RBC # BLD: 3.72 E12/L (ref 3.5–5.5)
SODIUM BLD-SCNC: 138 MMOL/L (ref 132–146)
TOTAL PROTEIN: 5.6 G/DL (ref 6.4–8.3)
WBC # BLD: 3.5 E9/L (ref 4.5–11.5)

## 2022-06-03 PROCEDURE — 6370000000 HC RX 637 (ALT 250 FOR IP): Performed by: INTERNAL MEDICINE

## 2022-06-03 PROCEDURE — 83735 ASSAY OF MAGNESIUM: CPT

## 2022-06-03 PROCEDURE — 84100 ASSAY OF PHOSPHORUS: CPT

## 2022-06-03 PROCEDURE — 6360000002 HC RX W HCPCS: Performed by: INTERNAL MEDICINE

## 2022-06-03 PROCEDURE — 6370000000 HC RX 637 (ALT 250 FOR IP): Performed by: FAMILY MEDICINE

## 2022-06-03 PROCEDURE — 2700000000 HC OXYGEN THERAPY PER DAY

## 2022-06-03 PROCEDURE — 85027 COMPLETE CBC AUTOMATED: CPT

## 2022-06-03 PROCEDURE — 83880 ASSAY OF NATRIURETIC PEPTIDE: CPT

## 2022-06-03 PROCEDURE — 2580000003 HC RX 258: Performed by: INTERNAL MEDICINE

## 2022-06-03 PROCEDURE — 6370000000 HC RX 637 (ALT 250 FOR IP): Performed by: PHYSICIAN ASSISTANT

## 2022-06-03 PROCEDURE — 80053 COMPREHEN METABOLIC PANEL: CPT

## 2022-06-03 PROCEDURE — 1200000000 HC SEMI PRIVATE

## 2022-06-03 PROCEDURE — 99232 SBSQ HOSP IP/OBS MODERATE 35: CPT | Performed by: INTERNAL MEDICINE

## 2022-06-03 PROCEDURE — 36415 COLL VENOUS BLD VENIPUNCTURE: CPT

## 2022-06-03 PROCEDURE — 94640 AIRWAY INHALATION TREATMENT: CPT

## 2022-06-03 RX ORDER — COLCHICINE 0.6 MG/1
0.3 TABLET ORAL 2 TIMES DAILY
Status: DISCONTINUED | OUTPATIENT
Start: 2022-06-03 | End: 2022-06-05

## 2022-06-03 RX ORDER — FUROSEMIDE 40 MG/1
20 TABLET ORAL DAILY
Status: DISCONTINUED | OUTPATIENT
Start: 2022-06-04 | End: 2022-06-04

## 2022-06-03 RX ADMIN — ACETAMINOPHEN 650 MG: 325 TABLET ORAL at 08:45

## 2022-06-03 RX ADMIN — ROPINIROLE HYDROCHLORIDE 2 MG: 2 TABLET, FILM COATED ORAL at 20:47

## 2022-06-03 RX ADMIN — SACUBITRIL AND VALSARTAN 1 TABLET: 24; 26 TABLET, FILM COATED ORAL at 08:45

## 2022-06-03 RX ADMIN — PREGABALIN 100 MG: 100 CAPSULE ORAL at 08:49

## 2022-06-03 RX ADMIN — ZOLPIDEM TARTRATE 5 MG: 5 TABLET ORAL at 23:08

## 2022-06-03 RX ADMIN — PANTOPRAZOLE SODIUM 40 MG: 40 TABLET, DELAYED RELEASE ORAL at 06:14

## 2022-06-03 RX ADMIN — SACUBITRIL AND VALSARTAN 1 TABLET: 24; 26 TABLET, FILM COATED ORAL at 20:47

## 2022-06-03 RX ADMIN — COLCHICINE 0.6 MG: 0.6 TABLET ORAL at 08:49

## 2022-06-03 RX ADMIN — APIXABAN 5 MG: 5 TABLET, FILM COATED ORAL at 08:45

## 2022-06-03 RX ADMIN — COLCHICINE 0.3 MG: 0.6 TABLET ORAL at 20:47

## 2022-06-03 RX ADMIN — FUROSEMIDE 40 MG: 10 INJECTION, SOLUTION INTRAMUSCULAR; INTRAVENOUS at 08:45

## 2022-06-03 RX ADMIN — HYDROCODONE BITARTRATE AND ACETAMINOPHEN 1 TABLET: 5; 325 TABLET ORAL at 18:11

## 2022-06-03 RX ADMIN — IPRATROPIUM BROMIDE AND ALBUTEROL SULFATE 1 AMPULE: .5; 2.5 SOLUTION RESPIRATORY (INHALATION) at 09:38

## 2022-06-03 RX ADMIN — METOPROLOL SUCCINATE 50 MG: 50 TABLET, EXTENDED RELEASE ORAL at 08:48

## 2022-06-03 RX ADMIN — SODIUM CHLORIDE, PRESERVATIVE FREE 10 ML: 5 INJECTION INTRAVENOUS at 20:50

## 2022-06-03 RX ADMIN — PREGABALIN 100 MG: 100 CAPSULE ORAL at 20:47

## 2022-06-03 RX ADMIN — IPRATROPIUM BROMIDE AND ALBUTEROL SULFATE 1 AMPULE: .5; 2.5 SOLUTION RESPIRATORY (INHALATION) at 13:51

## 2022-06-03 RX ADMIN — SODIUM CHLORIDE, PRESERVATIVE FREE 10 ML: 5 INJECTION INTRAVENOUS at 08:45

## 2022-06-03 RX ADMIN — METOPROLOL SUCCINATE 50 MG: 50 TABLET, EXTENDED RELEASE ORAL at 20:47

## 2022-06-03 RX ADMIN — APIXABAN 5 MG: 5 TABLET, FILM COATED ORAL at 20:46

## 2022-06-03 RX ADMIN — FUROSEMIDE 40 MG: 10 INJECTION, SOLUTION INTRAMUSCULAR; INTRAVENOUS at 17:52

## 2022-06-03 RX ADMIN — CLOPIDOGREL BISULFATE 75 MG: 75 TABLET ORAL at 08:49

## 2022-06-03 RX ADMIN — PREGABALIN 100 MG: 100 CAPSULE ORAL at 15:20

## 2022-06-03 RX ADMIN — SPIRONOLACTONE 25 MG: 25 TABLET ORAL at 08:48

## 2022-06-03 RX ADMIN — ATORVASTATIN CALCIUM 40 MG: 40 TABLET, FILM COATED ORAL at 20:46

## 2022-06-03 ASSESSMENT — PAIN SCALES - GENERAL
PAINLEVEL_OUTOF10: 0
PAINLEVEL_OUTOF10: 0
PAINLEVEL_OUTOF10: 6
PAINLEVEL_OUTOF10: 5
PAINLEVEL_OUTOF10: 0
PAINLEVEL_OUTOF10: 0
PAINLEVEL_OUTOF10: 6
PAINLEVEL_OUTOF10: 6

## 2022-06-03 ASSESSMENT — PAIN DESCRIPTION - ONSET: ONSET: ON-GOING

## 2022-06-03 ASSESSMENT — PAIN DESCRIPTION - DESCRIPTORS: DESCRIPTORS: ACHING;STABBING;DISCOMFORT

## 2022-06-03 ASSESSMENT — PAIN DESCRIPTION - LOCATION
LOCATION: BACK
LOCATION: BACK;OTHER (COMMENT)

## 2022-06-03 ASSESSMENT — PAIN - FUNCTIONAL ASSESSMENT: PAIN_FUNCTIONAL_ASSESSMENT: ACTIVITIES ARE NOT PREVENTED

## 2022-06-03 ASSESSMENT — PAIN DESCRIPTION - PAIN TYPE: TYPE: CHRONIC PAIN

## 2022-06-03 ASSESSMENT — PAIN DESCRIPTION - FREQUENCY: FREQUENCY: CONTINUOUS

## 2022-06-03 ASSESSMENT — PAIN DESCRIPTION - ORIENTATION: ORIENTATION: LOWER

## 2022-06-03 NOTE — PLAN OF CARE
Problem: Discharge Planning  Goal: Discharge to home or other facility with appropriate resources  6/3/2022 0032 by Luke Sweet RN  Outcome: Progressing  Flowsheets (Taken 6/2/2022 2000)  Discharge to home or other facility with appropriate resources: Identify barriers to discharge with patient and caregiver  6/2/2022 1828 by Radha Subramanian RN  Outcome: Progressing  Flowsheets (Taken 6/2/2022 1600)  Discharge to home or other facility with appropriate resources:   Identify barriers to discharge with patient and caregiver   Arrange for needed discharge resources and transportation as appropriate     Problem: Chronic Conditions and Co-morbidities  Goal: Patient's chronic conditions and co-morbidity symptoms are monitored and maintained or improved  6/3/2022 0032 by Luke Sweet RN  Outcome: Progressing  Flowsheets (Taken 6/2/2022 2000)  Care Plan - Patient's Chronic Conditions and Co-Morbidity Symptoms are Monitored and Maintained or Improved: Monitor and assess patient's chronic conditions and comorbid symptoms for stability, deterioration, or improvement  6/2/2022 1828 by Radha Subramanian RN  Outcome: Progressing  Flowsheets (Taken 6/2/2022 1600)  Care Plan - Patient's Chronic Conditions and Co-Morbidity Symptoms are Monitored and Maintained or Improved:   Monitor and assess patient's chronic conditions and comorbid symptoms for stability, deterioration, or improvement   Collaborate with multidisciplinary team to address chronic and comorbid conditions and prevent exacerbation or deterioration     Problem: Safety - Adult  Goal: Free from fall injury  6/3/2022 0032 by Luke Sweet RN  Outcome: Progressing  Flowsheets (Taken 6/3/2022 0031)  Free From Fall Injury: Instruct family/caregiver on patient safety  6/2/2022 1828 by Radha Subramanian RN  Outcome: Progressing

## 2022-06-03 NOTE — PROGRESS NOTES
PROGRESS NOTE     CARDIOLOGY    Chief complaint: Seen today for follow up, management & recommendations for coronary artery disease, ischemic cardiomyopathy    She was reclining in bed. She was comfortable and in no distress. She states that her breathing and overall feeling is much improved. No chest discomfort. Wt Readings from Last 3 Encounters:   06/03/22 268 lb 9.6 oz (121.8 kg)     Temp Readings from Last 3 Encounters:   06/03/22 (!) 95.5 °F (35.3 °C)     BP Readings from Last 3 Encounters:   06/03/22 111/71     Pulse Readings from Last 3 Encounters:   06/03/22 75         Intake/Output Summary (Last 24 hours) at 6/3/2022 1148  Last data filed at 6/3/2022 0833  Gross per 24 hour   Intake 420 ml   Output 800 ml   Net -380 ml       Recent Labs     06/01/22  0533 06/02/22  0549 06/03/22  0546   WBC 6.8 5.1 3.5*   HGB 9.5* 9.7* 9.5*   HCT 31.4* 32.5* 31.4*   MCV 84.2 84.6 84.4    214 181     Recent Labs     06/01/22  0533 06/02/22  0549 06/03/22  0546    137 138   K 3.3* 4.3 3.9    99 98   CO2 26 25 28   PHOS  --   --  3.1   BUN 26* 22 18   CREATININE 1.1* 1.0 1.0   MG  --   --  1.8     No results for input(s): PROTIME, INR in the last 72 hours. No results for input(s): CKTOTAL, CKMB, CKMBINDEX, TROPONINI in the last 72 hours. No results for input(s): BNP in the last 72 hours. No results for input(s): CHOL, HDL, TRIG in the last 72 hours. Invalid input(s): CHOLHDLR, LDLCALCU  No results for input(s): TROPHS in the last 72 hours.       [START ON 6/4/2022] furosemide (LASIX) tablet 20 mg, Daily  colchicine (COLCRYS) tablet 0.3 mg, BID  lidocaine 4 % external patch 1 patch, Daily  ipratropium-albuterol (DUONEB) nebulizer solution 1 ampule, Q6H WA  zolpidem (AMBIEN) tablet 5 mg, Nightly PRN  metoprolol succinate (TOPROL XL) extended release tablet 50 mg, BID  atorvastatin (LIPITOR) tablet 40 mg, Nightly  spironolactone (ALDACTONE) tablet 25 mg, Daily  fentaNYL (SUBLIMAZE) injection 50 sounds. Neuro: Full ROM X 4, EOMI, no tremors. EXT: No bilateral lower extremity edema  Skin: warm, dry, intact. Good turgor. Psych: A&O x 3, normal behavior, not anxious. Assessment/Recommendations  1. Anterior STEMI. Post MI chest pain being treated for pericarditis. No chest pain again today. 2. Coronary artery disease. 2 drug-eluting stents to the mid to distal LAD complicated by no reflow. 3. Ischemic cardiomyopathy. Hypervolemia has improved. I will give 1 more dose of IV Lasix this afternoon and then change to oral beginning tomorrow. 4. Paroxysmal atrial fibrillation. In sinus today. On Eliquis. 5. Hypertension  6. Hypercholesterolemia  7. Diabetes  8. Prior TIA  9. Restless leg syndrome  10. Fibromyalgia  11. Morbid obesity    Note: This report was completed using computerized voice recognition software. Every effort has been made to ensure accuracy, however; and invert and computerized transcription errors may be present.

## 2022-06-03 NOTE — PLAN OF CARE
Problem: Discharge Planning  Goal: Discharge to home or other facility with appropriate resources  6/3/2022 1824 by Rajat Berry RN  Outcome: Progressing  Flowsheets (Taken 6/3/2022 1520 by Adelina Avina)  Discharge to home or other facility with appropriate resources:   Identify barriers to discharge with patient and caregiver   Arrange for needed discharge resources and transportation as appropriate  6/3/2022 0903 by Rajat Berry RN  Outcome: Progressing  Flowsheets (Taken 6/3/2022 3852)  Discharge to home or other facility with appropriate resources:   Identify barriers to discharge with patient and caregiver   Arrange for needed discharge resources and transportation as appropriate     Problem: Chronic Conditions and Co-morbidities  Goal: Patient's chronic conditions and co-morbidity symptoms are monitored and maintained or improved  6/3/2022 1824 by Rajat Berry RN  Outcome: Progressing  Flowsheets (Taken 6/3/2022 1520 by Adelina Avina)  Care Plan - Patient's Chronic Conditions and Co-Morbidity Symptoms are Monitored and Maintained or Improved:   Monitor and assess patient's chronic conditions and comorbid symptoms for stability, deterioration, or improvement   Collaborate with multidisciplinary team to address chronic and comorbid conditions and prevent exacerbation or deterioration  6/3/2022 0903 by Rajat Berry RN  Outcome: Progressing  Flowsheets (Taken 6/3/2022 0843)  Care Plan - Patient's Chronic Conditions and Co-Morbidity Symptoms are Monitored and Maintained or Improved:   Monitor and assess patient's chronic conditions and comorbid symptoms for stability, deterioration, or improvement   Collaborate with multidisciplinary team to address chronic and comorbid conditions and prevent exacerbation or deterioration     Problem: Safety - Adult  Goal: Free from fall injury  6/3/2022 1824 by Rajat Berry RN  Outcome: Progressing  6/3/2022 0903 by Rajat Berry RN  Outcome: Progressing  Flowsheets (Taken 6/3/2022 0845)  Free From Fall Injury: Instruct family/caregiver on patient safety     Problem: ABCDS Injury Assessment  Goal: Absence of physical injury  6/3/2022 1824 by Rafael Hartman RN  Outcome: Progressing  6/3/2022 0903 by Rafael Hartman RN  Outcome: Progressing  Flowsheets (Taken 6/3/2022 0845)  Absence of Physical Injury: Implement safety measures based on patient assessment     Problem: Pain  Goal: Verbalizes/displays adequate comfort level or baseline comfort level  6/3/2022 1824 by Rafael Hartman RN  Outcome: Progressing  6/3/2022 0903 by Rafael Hartman RN  Outcome: Progressing  Flowsheets (Taken 6/3/2022 0845)  Verbalizes/displays adequate comfort level or baseline comfort level:   Encourage patient to monitor pain and request assistance   Assess pain using appropriate pain scale

## 2022-06-03 NOTE — PROGRESS NOTES
Hospitalist Progress Note      SYNOPSIS: Patient admitted on 2022 for STEMI (ST elevation myocardial infarction) [facility])  presented to [facility] ER with complaint of chest pain. In the ER she was found to have STEMI with ST elevations in lead I and aVL and lead V2. She was given aspirin Brilinta, heparin bolus, started on heparin drip and transferred emergently to [facility] for cardiac catheterization. She was found to have acute 100% occlusion of mid to distal LAD ( ? Embolic event due to PAF versus ruptured plaque ), status post primary PCI using two drug-eluting stents with persistence of no reflow despite vasodilator therapy. 50%-60% mid PDA stenosis. PAF during PCI. Patient has persistent CP after procedure, started on colchicine. Patient will need placement       SUBJECTIVE:    Patient seen and examined. She states that her SOB is improved and she is now bring up phlegm with the breathing treatments. + C/o pain in her nipples- squeezing sensation and not chest pain per se. + Back pain. +diarrhea since the cath- 1 episode this morning. Temp (24hrs), Av.8 °F (36.6 °C), Min:97.2 °F (36.2 °C), Max:98.2 °F (36.8 °C)    DIET: ADULT DIET; Regular; Low Fat/Low Chol/High Fiber/2 gm Na  CODE: Limited    Intake/Output Summary (Last 24 hours) at 6/3/2022 0918  Last data filed at 6/3/2022 1582  Gross per 24 hour   Intake 540 ml   Output 800 ml   Net -260 ml         OBJECTIVE:    /69   Pulse 89   Temp 97.5 °F (36.4 °C) (Oral)   Resp 18   Ht 5' 5\" (1.651 m)   Wt 268 lb 9.6 oz (121.8 kg)   SpO2 100%   BMI 44.70 kg/m²     General appearance:  awake, alert, and oriented to person, place, time, and purpose; appears stated age and cooperative; no apparent distress no labored breathing 2L  HEENT:  Conjunctivae/corneas clear. Neck: Supple. No jugular venous distention.    Respiratory: Diminished breath sounds (b/l)   Cardiovascular: rhythm regular; rate controlled; no murmurs  Abdomen: Soft, nontender, nondistended  Extremities:  peripheral pulses present; no peripheral edema; no ulcers  Musculoskeletal: No clubbing, cyanosis, trace edema (b/l)   Skin:  No rashes  on visible skin  Neurologic: awake, alert and following commands     ASSESSMENT and PLAN:    1.) STEMI/CAD- S/p cardiac catheterization acute 100% occlusion of mid to distal LAD ( ? Embolic event due to PAF versus ruptured plaque ), status post primary PCI using two drug-eluting stents with persistence of no reflow despite vasodilator therapy. 50%-60% mid PDA stenosis. PAF during PCI. Aspirin for one week, Plavix, eliquis, statin. Toprol and entresto to be started depending on BP and HR. Echocardiogram. Cardiac rehab. Likely needs placement. Await PT/OT evals. Sed rate and CRP. 2.) Elevated proBNP-IV diuresis per cardiology (40mg IV BID). 3.)  Elevated sed rate/CRP- started on colchicine    4.) PAF- during PCI: as per cards. 5.) Hypertension: controlled. 6.) Hyperlipidemia: resolved.     7.) Diabetes mellitus type II: controlled. 8.) Elevated LFTs: resolved. 9.) Obesity class III- BMI 48    10.) Fibromyalgia: analgesics PRN.     11.) SOB: Chest xray today: \"stable probable pleural/effusion on the lateral view; CT scan would be more definitive to further characterize if clinically warranted. \"    BNP pending today and Tomorrow. On lasix IV BID.   patient with asthma Duonebs Q6Hrs while awake. Flutter valve offered. 12.) Back pain: lidoderm patch ordered. Analgesics PRN.     13.) Diarrhea: May be secondary to colchicine; will need to f/u with cardiology any recs for an alternate or questionable whether patient can become tolerant. 14.) Acute respiratory failure: patient is on 2L in the hospital; on no O2 at home. Will need ambulatory pulse OX closer to D/c. Possible subacute rehab on discharge    PT/OT reordered today.      Medications:  REVIEWED DAILY    Infusion Medications    sodium chloride       Scheduled Medications    lidocaine  1 patch TransDERmal Daily    ipratropium-albuterol  1 ampule Inhalation Q6H WA    metoprolol succinate  50 mg Oral BID    atorvastatin  40 mg Oral Nightly    spironolactone  25 mg Oral Daily    pregabalin  100 mg Oral TID    furosemide  40 mg IntraVENous BID    colchicine  0.6 mg Oral BID    sodium chloride flush  5-40 mL IntraVENous 2 times per day    clopidogrel  75 mg Oral Daily    apixaban  5 mg Oral BID    sacubitril-valsartan  1 tablet Oral BID    pantoprazole  40 mg Oral QAM AC    rOPINIRole  2 mg Oral Nightly     PRN Meds: zolpidem, fentanNYL, LORazepam, HYDROcodone 5 mg - acetaminophen, sodium chloride flush, sodium chloride, acetaminophen, perflutren lipid microspheres, nitroGLYCERIN, [Held by provider] hydrOXYzine    Labs:     Recent Labs     06/01/22  0533 06/02/22  0549 06/03/22  0546   WBC 6.8 5.1 3.5*   HGB 9.5* 9.7* 9.5*   HCT 31.4* 32.5* 31.4*    214 181       Recent Labs     06/01/22  0533 06/02/22  0549 06/03/22  0546    137 138   K 3.3* 4.3 3.9    99 98   CO2 26 25 28   BUN 26* 22 18   CREATININE 1.1* 1.0 1.0   CALCIUM 8.3* 8.7 8.5*   PHOS  --   --  3.1       Recent Labs     06/01/22  0533 06/02/22  0549 06/03/22  0546   PROT 5.8* 6.2* 5.6*   ALKPHOS 88 92 82   ALT 21 20 15   AST 32* 28 25   BILITOT 0.4 0.5 0.3       No results for input(s): INR in the last 72 hours. No results for input(s): Nirmal Buenrostro in the last 72 hours.     Chronic labs:    Lab Results   Component Value Date    CHOL 154 05/29/2022    TRIG 116 05/29/2022    HDL 54 05/29/2022    LDLCALC 77 05/29/2022    TSH 1.510 05/29/2022    INR 1.0 10/18/2016    LABA1C 6.1 (H) 05/29/2022       Radiology: REVIEWED DAILY    +++++++++++++++++++++++++++++++++++++++++++++++++  Severa Meeter, MD   [employer] [facility] - [address], [place]  +++++++++++++++++++++++++++++++++++++++++++++++++  NOTE: This report was transcribed using voice recognition software. Every effort was made to ensure accuracy; however, inadvertent computerized transcription errors may be present.

## 2022-06-03 NOTE — CARE COORDINATION
6/3 Care Coordination: Discharge plan remains [facility], We need precert, still pending. Will need complete Hens when discharge order placed. Envelope in soft chart. CM/SW will continue to follow for discharge planning.    Leora Rankin BSN,RN--BC  [phone]

## 2022-06-03 NOTE — PATIENT CARE CONFERENCE
[facility] Quality Flow/Interdisciplinary Rounds Progress Note        Quality Flow Rounds held on Lydia 3, 2022    Disciplines Attending:  Bedside Nurse, ,  and Nursing Unit Leadership    Lise Mckenzie was admitted on 5/28/2022  5:25 AM    Anticipated Discharge Date:  Expected Discharge Date: 06/04/22    Disposition:    Willian Score:  Willian Scale Score: 20    Readmission Risk              Risk of Unplanned Readmission:  11           Discussed patient goal for the day, patient clinical progression, and barriers to discharge.   The following Goal(s) of the Day/Commitment(s) have been identified:  Labs - Report Results      Marilia Ramirez RN  Lydia 3, 2022

## 2022-06-04 LAB
ALBUMIN SERPL-MCNC: 3 G/DL (ref 3.5–5.2)
ALP BLD-CCNC: 87 U/L (ref 35–104)
ALT SERPL-CCNC: 15 U/L (ref 0–32)
ANION GAP SERPL CALCULATED.3IONS-SCNC: 15 MMOL/L (ref 7–16)
AST SERPL-CCNC: 22 U/L (ref 0–31)
BILIRUB SERPL-MCNC: 0.3 MG/DL (ref 0–1.2)
BUN BLDV-MCNC: 19 MG/DL (ref 6–23)
CALCIUM SERPL-MCNC: 8.6 MG/DL (ref 8.6–10.2)
CHLORIDE BLD-SCNC: 97 MMOL/L (ref 98–107)
CO2: 28 MMOL/L (ref 22–29)
CREAT SERPL-MCNC: 1.2 MG/DL (ref 0.5–1)
GFR AFRICAN AMERICAN: 54
GFR NON-AFRICAN AMERICAN: 45 ML/MIN/1.73
GLUCOSE BLD-MCNC: 109 MG/DL (ref 74–99)
HCT VFR BLD CALC: 33.8 % (ref 34–48)
HEMOGLOBIN: 10.1 G/DL (ref 11.5–15.5)
MAGNESIUM: 1.8 MG/DL (ref 1.6–2.6)
MCH RBC QN AUTO: 25.4 PG (ref 26–35)
MCHC RBC AUTO-ENTMCNC: 29.9 % (ref 32–34.5)
MCV RBC AUTO: 85.1 FL (ref 80–99.9)
PDW BLD-RTO: 15.5 FL (ref 11.5–15)
PHOSPHORUS: 3.6 MG/DL (ref 2.5–4.5)
PLATELET # BLD: 242 E9/L (ref 130–450)
PMV BLD AUTO: 11.2 FL (ref 7–12)
POTASSIUM SERPL-SCNC: 4.3 MMOL/L (ref 3.5–5)
PRO-BNP: ABNORMAL PG/ML (ref 0–125)
RBC # BLD: 3.97 E12/L (ref 3.5–5.5)
SODIUM BLD-SCNC: 140 MMOL/L (ref 132–146)
TOTAL PROTEIN: 6.1 G/DL (ref 6.4–8.3)
WBC # BLD: 5.3 E9/L (ref 4.5–11.5)

## 2022-06-04 PROCEDURE — 6370000000 HC RX 637 (ALT 250 FOR IP): Performed by: FAMILY MEDICINE

## 2022-06-04 PROCEDURE — 84100 ASSAY OF PHOSPHORUS: CPT

## 2022-06-04 PROCEDURE — 6370000000 HC RX 637 (ALT 250 FOR IP): Performed by: INTERNAL MEDICINE

## 2022-06-04 PROCEDURE — 6370000000 HC RX 637 (ALT 250 FOR IP): Performed by: PHYSICIAN ASSISTANT

## 2022-06-04 PROCEDURE — 2580000003 HC RX 258: Performed by: INTERNAL MEDICINE

## 2022-06-04 PROCEDURE — 1200000000 HC SEMI PRIVATE

## 2022-06-04 PROCEDURE — 80053 COMPREHEN METABOLIC PANEL: CPT

## 2022-06-04 PROCEDURE — 83735 ASSAY OF MAGNESIUM: CPT

## 2022-06-04 PROCEDURE — 99233 SBSQ HOSP IP/OBS HIGH 50: CPT | Performed by: INTERNAL MEDICINE

## 2022-06-04 PROCEDURE — 94640 AIRWAY INHALATION TREATMENT: CPT

## 2022-06-04 PROCEDURE — 83880 ASSAY OF NATRIURETIC PEPTIDE: CPT

## 2022-06-04 PROCEDURE — 85027 COMPLETE CBC AUTOMATED: CPT

## 2022-06-04 PROCEDURE — 2700000000 HC OXYGEN THERAPY PER DAY

## 2022-06-04 PROCEDURE — 36415 COLL VENOUS BLD VENIPUNCTURE: CPT

## 2022-06-04 RX ORDER — ATORVASTATIN CALCIUM 40 MG/1
80 TABLET, FILM COATED ORAL NIGHTLY
Status: DISCONTINUED | OUTPATIENT
Start: 2022-06-04 | End: 2022-06-06 | Stop reason: HOSPADM

## 2022-06-04 RX ORDER — TORSEMIDE 20 MG/1
20 TABLET ORAL DAILY
Status: DISCONTINUED | OUTPATIENT
Start: 2022-06-04 | End: 2022-06-06 | Stop reason: HOSPADM

## 2022-06-04 RX ADMIN — ROPINIROLE HYDROCHLORIDE 2 MG: 2 TABLET, FILM COATED ORAL at 21:15

## 2022-06-04 RX ADMIN — PREGABALIN 100 MG: 100 CAPSULE ORAL at 08:30

## 2022-06-04 RX ADMIN — ATORVASTATIN CALCIUM 80 MG: 40 TABLET, FILM COATED ORAL at 21:14

## 2022-06-04 RX ADMIN — HYDROCODONE BITARTRATE AND ACETAMINOPHEN 1 TABLET: 5; 325 TABLET ORAL at 08:30

## 2022-06-04 RX ADMIN — HYDROCODONE BITARTRATE AND ACETAMINOPHEN 1 TABLET: 5; 325 TABLET ORAL at 01:32

## 2022-06-04 RX ADMIN — COLCHICINE 0.3 MG: 0.6 TABLET ORAL at 21:14

## 2022-06-04 RX ADMIN — CLOPIDOGREL BISULFATE 75 MG: 75 TABLET ORAL at 08:31

## 2022-06-04 RX ADMIN — ACETAMINOPHEN 650 MG: 325 TABLET ORAL at 21:15

## 2022-06-04 RX ADMIN — COLCHICINE 0.3 MG: 0.6 TABLET ORAL at 08:29

## 2022-06-04 RX ADMIN — PREGABALIN 100 MG: 100 CAPSULE ORAL at 21:15

## 2022-06-04 RX ADMIN — HYDROCODONE BITARTRATE AND ACETAMINOPHEN 1 TABLET: 5; 325 TABLET ORAL at 15:53

## 2022-06-04 RX ADMIN — METOPROLOL SUCCINATE 50 MG: 50 TABLET, EXTENDED RELEASE ORAL at 08:30

## 2022-06-04 RX ADMIN — PREGABALIN 100 MG: 100 CAPSULE ORAL at 15:53

## 2022-06-04 RX ADMIN — TORSEMIDE 20 MG: 20 TABLET ORAL at 16:02

## 2022-06-04 RX ADMIN — IPRATROPIUM BROMIDE AND ALBUTEROL SULFATE 1 AMPULE: .5; 2.5 SOLUTION RESPIRATORY (INHALATION) at 20:59

## 2022-06-04 RX ADMIN — APIXABAN 5 MG: 5 TABLET, FILM COATED ORAL at 08:31

## 2022-06-04 RX ADMIN — SPIRONOLACTONE 25 MG: 25 TABLET ORAL at 08:30

## 2022-06-04 RX ADMIN — APIXABAN 5 MG: 5 TABLET, FILM COATED ORAL at 21:15

## 2022-06-04 RX ADMIN — SODIUM CHLORIDE, PRESERVATIVE FREE 10 ML: 5 INJECTION INTRAVENOUS at 08:32

## 2022-06-04 RX ADMIN — SODIUM CHLORIDE, PRESERVATIVE FREE 10 ML: 5 INJECTION INTRAVENOUS at 21:17

## 2022-06-04 RX ADMIN — IPRATROPIUM BROMIDE AND ALBUTEROL SULFATE 1 AMPULE: .5; 2.5 SOLUTION RESPIRATORY (INHALATION) at 09:27

## 2022-06-04 RX ADMIN — METOPROLOL SUCCINATE 50 MG: 50 TABLET, EXTENDED RELEASE ORAL at 21:15

## 2022-06-04 RX ADMIN — SACUBITRIL AND VALSARTAN 1 TABLET: 24; 26 TABLET, FILM COATED ORAL at 08:29

## 2022-06-04 RX ADMIN — IPRATROPIUM BROMIDE AND ALBUTEROL SULFATE 1 AMPULE: .5; 2.5 SOLUTION RESPIRATORY (INHALATION) at 14:47

## 2022-06-04 RX ADMIN — PANTOPRAZOLE SODIUM 40 MG: 40 TABLET, DELAYED RELEASE ORAL at 06:29

## 2022-06-04 RX ADMIN — SACUBITRIL AND VALSARTAN 1 TABLET: 24; 26 TABLET, FILM COATED ORAL at 21:14

## 2022-06-04 RX ADMIN — FUROSEMIDE 20 MG: 40 TABLET ORAL at 08:31

## 2022-06-04 RX ADMIN — ZOLPIDEM TARTRATE 5 MG: 5 TABLET ORAL at 21:15

## 2022-06-04 ASSESSMENT — PAIN DESCRIPTION - ONSET: ONSET: ON-GOING

## 2022-06-04 ASSESSMENT — PAIN SCALES - GENERAL
PAINLEVEL_OUTOF10: 6
PAINLEVEL_OUTOF10: 6
PAINLEVEL_OUTOF10: 5
PAINLEVEL_OUTOF10: 4
PAINLEVEL_OUTOF10: 6

## 2022-06-04 ASSESSMENT — PAIN DESCRIPTION - DESCRIPTORS: DESCRIPTORS: ACHING;DISCOMFORT

## 2022-06-04 ASSESSMENT — PAIN - FUNCTIONAL ASSESSMENT: PAIN_FUNCTIONAL_ASSESSMENT: PREVENTS OR INTERFERES SOME ACTIVE ACTIVITIES AND ADLS

## 2022-06-04 ASSESSMENT — PAIN DESCRIPTION - LOCATION
LOCATION: BACK
LOCATION: BACK

## 2022-06-04 ASSESSMENT — PAIN DESCRIPTION - ORIENTATION: ORIENTATION: MID

## 2022-06-04 ASSESSMENT — PAIN DESCRIPTION - PAIN TYPE: TYPE: CHRONIC PAIN

## 2022-06-04 ASSESSMENT — PAIN SCALES - WONG BAKER
WONGBAKER_NUMERICALRESPONSE: 2
WONGBAKER_NUMERICALRESPONSE: 2

## 2022-06-04 ASSESSMENT — PAIN DESCRIPTION - FREQUENCY: FREQUENCY: CONTINUOUS

## 2022-06-04 NOTE — PROGRESS NOTES
Cardiology Progress Note:    Narrative:  · Heart rates in the 80s and blood pressure in the 110s  · proBNP trending down but still ~18,000. CBC relatively stable with mild chronic anemia  · Verbalizes no complaints. On room air but slightly dyspneic upon sitting up      Objective:  Alert and oriented x3, anxious  JVP mildly elevated  Lungs are clear to auscultation with decreased breath sounds at the bases  Heart is regular rate and rhythm and no auscultable murmurs or rubs or gallops  No pitting edema. Recent Labs     06/04/22  0549 06/03/22  0546 06/02/22  0549   WBC 5.3 3.5* 5.1   HGB 10.1* 9.5* 9.7*   HCT 33.8* 31.4* 32.5*   MCV 85.1 84.4 84.6    181 214       Lab Results   Component Value Date     06/04/2022    K 4.3 06/04/2022    K 4.0 05/31/2022    CL 97 06/04/2022    CO2 28 06/04/2022    BUN 19 06/04/2022    CREATININE 1.2 06/04/2022    GLUCOSE 109 06/04/2022    CALCIUM 8.6 06/04/2022        Allergies   Allergen Reactions    Dilaudid [Hydromorphone]          Assessment:  1. Anterior MI status post PCI  1. Coronary angiogram and PCI (5/28/2022-Dr. Harlen Goldberg): Radial access. Culprit was mid to distal LAD occlusion. There was PCI with 2 overlapping MARCELA with severe no reflow. MARIA LUZ I flow at the end of the procedure. No other obstructive disease noted. 2. Acute systolic heart failure  1. TTE technically challenging. Mild LV dilation with LVEF 30 to 35% (38% by biplane) without LV thrombus. Normal RV size and function. No apparent severe valve disease. 3. PAF on apixaban  4. Hypertension  5. Type 2 diabetes  6. Prior TIA  7. BMI 44        Recommendations:  · Discontinue LifeVest while inpatient. Please place patient on telemetry. She is bothered by the LifeVest's weight and has stopped. I discussed the relative benefits and risks of the WCD and she is leaning more towards not wanting it which I concur with in general.  We will readdress tomorrow.   · Continue clopidogrel plus apixaban for at least 12 months followed by aspirin plus apixaban  · Continue metoprolol succinate and sacubitril/valsartan as is  · Continue spironolactone as is  · Change furosemide to torsemide 20 mg daily  · Check CRP tomorrow and if normal discontinue colchicine  · Increase atorvastatin to 80 mg daily. · Anticipate discharge in the next 24 to 48 hours        We will follow.       Felipe Buchanan MD, [facility]  Interventional Cardiology/Structural Heart Disease  Office: [phone]

## 2022-06-04 NOTE — PLAN OF CARE
Problem: Discharge Planning  Goal: Discharge to home or other facility with appropriate resources  6/4/2022 0036 by Ubaldo James RN  Outcome: Progressing  6/3/2022 1824 by Thomas Carrasquillo RN  Outcome: Progressing  Flowsheets (Taken 6/3/2022 1520 by Aviva Aldana)  Discharge to home or other facility with appropriate resources:   Identify barriers to discharge with patient and caregiver   Arrange for needed discharge resources and transportation as appropriate     Problem: Chronic Conditions and Co-morbidities  Goal: Patient's chronic conditions and co-morbidity symptoms are monitored and maintained or improved  6/4/2022 0036 by Ubaldo James RN  Outcome: Progressing  6/3/2022 1824 by Thomas Carrasquillo RN  Outcome: Progressing  Flowsheets (Taken 6/3/2022 1520 by Aviva Aldana)  Care Plan - Patient's Chronic Conditions and Co-Morbidity Symptoms are Monitored and Maintained or Improved:   Monitor and assess patient's chronic conditions and comorbid symptoms for stability, deterioration, or improvement   Collaborate with multidisciplinary team to address chronic and comorbid conditions and prevent exacerbation or deterioration     Problem: Safety - Adult  Goal: Free from fall injury  6/4/2022 0036 by Ubaldo James RN  Outcome: Progressing  6/3/2022 1824 by Thomas Carrasquillo RN  Outcome: Progressing     Problem: ABCDS Injury Assessment  Goal: Absence of physical injury  6/4/2022 0036 by Ubaldo James RN  Outcome: Progressing  6/3/2022 1824 by Thomas Carrasquillo RN  Outcome: Progressing     Problem: Pain  Goal: Verbalizes/displays adequate comfort level or baseline comfort level  6/4/2022 0036 by Ubaldo James RN  Outcome: Progressing  6/3/2022 1824 by Thomas Carrasquillo RN  Outcome: Progressing

## 2022-06-04 NOTE — PROGRESS NOTES
Hospitalist Progress Note      SYNOPSIS: Patient admitted on 2022 for STEMI (ST elevation myocardial infarction) [facility])  presented to [facility] ER with complaint of chest pain. In the ER she was found to have STEMI with ST elevations in lead I and aVL and lead V2. She was given aspirin Brilinta, heparin bolus, started on heparin drip and transferred emergently to [facility]note 3[facility] for cardiac catheterization. She was found to have acute 100% occlusion of mid to distal LAD ( ? Embolic event due to PAF versus ruptured plaque ), status post primary PCI using two drug-eluting stents with persistence of no reflow despite vasodilator therapy. 50%-60% mid PDA stenosis. PAF during PCI. Patient has persistent CP after procedure, started on colchicine. Patient will need placement       SUBJECTIVE:    Patient was seen and examined, reports that she is feeling better, waiting for discharge plan. Diarrhea resolved  No fever or chills  No other issues overnight  Continues to have some back pain      Temp (24hrs), Av.5 °F (36.4 °C), Min:97 °F (36.1 °C), Max:98.2 °F (36.8 °C)    DIET: ADULT DIET; Regular; Low Fat/Low Chol/High Fiber/2 gm Na  CODE: Limited    Intake/Output Summary (Last 24 hours) at 2022 1525  Last data filed at 6/3/2022 2245  Gross per 24 hour   Intake 120 ml   Output --   Net 120 ml         OBJECTIVE:    /84   Pulse 68   Temp 97.2 °F (36.2 °C) (Oral)   Resp 18   Ht 5' 5\" (1.651 m)   Wt 264 lb 3 oz (119.8 kg)   SpO2 98%   BMI 43.96 kg/m²     General appearance:  awake, alert, and oriented to person, place, time, and purpose; appears stated age and cooperative; no apparent distress no labored breathing 1L  HEENT:  Conjunctivae/corneas clear. Neck: Supple. No jugular venous distention.    Respiratory: Diminished breath sounds (b/l)   Cardiovascular: rhythm regular; rate controlled; no murmurs  Abdomen: Soft, nontender, nondistended  Extremities:  peripheral pulses present; no peripheral edema; no ulcers  Musculoskeletal: No clubbing, cyanosis, trace edema (b/l)   Skin:  No rashes  on visible skin  Neurologic: awake, alert and following commands     ASSESSMENT and PLAN:    1.) STEMI/CAD/ischemic cardiomyopathy- S/p cardiac catheterization acute 100% occlusion of mid to distal LAD ( ? Embolic event due to PAF versus ruptured plaque ), status post primary PCI using two drug-eluting stents with persistence of no reflow despite vasodilator therapy. 50%-60% mid PDA stenosis. PAF during PCI. Aspirin for one week, Plavix, eliquis, statin. Toprol and entresto to be started depending on BP and HR. Echocardiogram showed ischemic cardiomyopathy with EF 04%, grade 2 diastolic dysfunction, moderately dilated left atrium. Cardiac rehab. Likely needs placement. Await PT/OT evals. Sed rate and CRP. The patient was started on metoprolol succinate, Entresto and spironolactone. 2.) Elevated proBNP-IV diuresis per cardiology (40mg IV BID). Transitioning to p.o. torsemide    3.)  Elevated sed rate/CRP- started on colchicine    4.) PAF- during PCI: as per cards. 5.) Hypertension: controlled. Medications as above    6.) Hyperlipidemia: resolved.     7.) Diabetes mellitus type II: controlled. 8.) Elevated LFTs: resolved. 9.) Obesity class III- BMI 48    10.) Fibromyalgia: analgesics PRN.     12.) Back pain: lidoderm patch ordered. Analgesics PRN.     13.) Diarrhea: Resolved    14.) Acute respiratory failure: patient is on 2L in the hospital; on no O2 at home. Will need ambulatory pulse OX closer to D/c. Discharge plan remains pending, SW following.     PT/OT reordered     Medications:  REVIEWED DAILY    Infusion Medications    sodium chloride       Scheduled Medications    atorvastatin  80 mg Oral Nightly    torsemide  20 mg Oral Daily    colchicine  0.3 mg Oral BID    lidocaine  1 patch TransDERmal Daily    ipratropium-albuterol  1 ampule Inhalation Q6H WA    metoprolol succinate 50 mg Oral BID    spironolactone  25 mg Oral Daily    pregabalin  100 mg Oral TID    sodium chloride flush  5-40 mL IntraVENous 2 times per day    clopidogrel  75 mg Oral Daily    apixaban  5 mg Oral BID    sacubitril-valsartan  1 tablet Oral BID    pantoprazole  40 mg Oral QAM AC    rOPINIRole  2 mg Oral Nightly     PRN Meds: zolpidem, fentanNYL, LORazepam, HYDROcodone 5 mg - acetaminophen, sodium chloride flush, sodium chloride, acetaminophen, perflutren lipid microspheres, nitroGLYCERIN, [Held by provider] hydrOXYzine pamoate    Labs:     Recent Labs     06/02/22  0549 06/03/22  0546 06/04/22  0549   WBC 5.1 3.5* 5.3   HGB 9.7* 9.5* 10.1*   HCT 32.5* 31.4* 33.8*    181 242       Recent Labs     06/02/22  0549 06/03/22  0546 06/04/22  0549    138 140   K 4.3 3.9 4.3   CL 99 98 97*   CO2 25 28 28   BUN 22 18 19   CREATININE 1.0 1.0 1.2*   CALCIUM 8.7 8.5* 8.6   PHOS  --  3.1 3.6       Recent Labs     06/02/22  0549 06/03/22  0546 06/04/22  0549   PROT 6.2* 5.6* 6.1*   ALKPHOS 92 82 87   ALT 20 15 15   AST 28 25 22   BILITOT 0.5 0.3 0.3       No results for input(s): INR in the last 72 hours. No results for input(s): Janis Taylor in the last 72 hours. Chronic labs:    Lab Results   Component Value Date    CHOL 154 05/29/2022    TRIG 116 05/29/2022    HDL 54 05/29/2022    LDLCALC 77 05/29/2022    TSH 1.510 05/29/2022    INR 1.0 10/18/2016    LABA1C 6.1 (H) 05/29/2022       Radiology: REVIEWED DAILY    +++++++++++++++++++++++++++++++++++++++++++++++++  Papo Villavicencio MD   [facility] [place] Physician - [address] [place] [address]  +++++++++++++++++++++++++++++++++++++++++++++++++  NOTE: This report was transcribed using voice recognition software. Every effort was made to ensure accuracy; however, inadvertent computerized transcription errors may be present.

## 2022-06-04 NOTE — CARE COORDINATION
6/4/2022 social work transition of care planning  Sw notified that Bayron Campos has been obtained(good for 48 hours). Sw spoke with floor Nurse,not ready. Will need covid test day of discharge. 7000 will need completed once discharge order in epic.   Electronically signed by DIANA Benites on 6/4/2022 at 3:43 PM

## 2022-06-04 NOTE — PLAN OF CARE
Problem: Discharge Planning  Goal: Discharge to home or other facility with appropriate resources  6/4/2022 1222 by Ezequiel Grey RN  Outcome: Progressing  6/4/2022 0036 by Emre Ross RN  Outcome: Progressing     Problem: Chronic Conditions and Co-morbidities  Goal: Patient's chronic conditions and co-morbidity symptoms are monitored and maintained or improved  6/4/2022 1222 by Ezequiel Grey RN  Outcome: Progressing  6/4/2022 0036 by Emre Ross RN  Outcome: Progressing     Problem: Safety - Adult  Goal: Free from fall injury  6/4/2022 1222 by Ezequiel Grey RN  Outcome: Progressing  6/4/2022 0036 by Emre Ross RN  Outcome: Progressing     Problem: ABCDS Injury Assessment  Goal: Absence of physical injury  6/4/2022 1222 by Ezequiel Grey RN  Outcome: Progressing  6/4/2022 0036 by Emre Ross RN  Outcome: Progressing

## 2022-06-05 LAB
ALBUMIN SERPL-MCNC: 3 G/DL (ref 3.5–5.2)
ALP BLD-CCNC: 86 U/L (ref 35–104)
ALT SERPL-CCNC: 13 U/L (ref 0–32)
ANION GAP SERPL CALCULATED.3IONS-SCNC: 15 MMOL/L (ref 7–16)
AST SERPL-CCNC: 20 U/L (ref 0–31)
BILIRUB SERPL-MCNC: 0.4 MG/DL (ref 0–1.2)
BUN BLDV-MCNC: 21 MG/DL (ref 6–23)
C-REACTIVE PROTEIN: 2.1 MG/DL (ref 0–0.4)
CALCIUM SERPL-MCNC: 8.6 MG/DL (ref 8.6–10.2)
CHLORIDE BLD-SCNC: 96 MMOL/L (ref 98–107)
CO2: 27 MMOL/L (ref 22–29)
CREAT SERPL-MCNC: 1.1 MG/DL (ref 0.5–1)
GFR AFRICAN AMERICAN: 60
GFR NON-AFRICAN AMERICAN: 50 ML/MIN/1.73
GLUCOSE BLD-MCNC: 127 MG/DL (ref 74–99)
HCT VFR BLD CALC: 34.4 % (ref 34–48)
HEMOGLOBIN: 10.3 G/DL (ref 11.5–15.5)
MAGNESIUM: 1.8 MG/DL (ref 1.6–2.6)
MCH RBC QN AUTO: 25.4 PG (ref 26–35)
MCHC RBC AUTO-ENTMCNC: 29.9 % (ref 32–34.5)
MCV RBC AUTO: 84.7 FL (ref 80–99.9)
PDW BLD-RTO: 15.3 FL (ref 11.5–15)
PHOSPHORUS: 3.7 MG/DL (ref 2.5–4.5)
PLATELET # BLD: 248 E9/L (ref 130–450)
PMV BLD AUTO: 11.2 FL (ref 7–12)
POTASSIUM SERPL-SCNC: 3.9 MMOL/L (ref 3.5–5)
RBC # BLD: 4.06 E12/L (ref 3.5–5.5)
SODIUM BLD-SCNC: 138 MMOL/L (ref 132–146)
TOTAL PROTEIN: 6.1 G/DL (ref 6.4–8.3)
WBC # BLD: 5.9 E9/L (ref 4.5–11.5)

## 2022-06-05 PROCEDURE — 80053 COMPREHEN METABOLIC PANEL: CPT

## 2022-06-05 PROCEDURE — 6370000000 HC RX 637 (ALT 250 FOR IP): Performed by: INTERNAL MEDICINE

## 2022-06-05 PROCEDURE — 36415 COLL VENOUS BLD VENIPUNCTURE: CPT

## 2022-06-05 PROCEDURE — 6370000000 HC RX 637 (ALT 250 FOR IP): Performed by: PHYSICIAN ASSISTANT

## 2022-06-05 PROCEDURE — 83735 ASSAY OF MAGNESIUM: CPT

## 2022-06-05 PROCEDURE — 84100 ASSAY OF PHOSPHORUS: CPT

## 2022-06-05 PROCEDURE — 1200000000 HC SEMI PRIVATE

## 2022-06-05 PROCEDURE — 99233 SBSQ HOSP IP/OBS HIGH 50: CPT | Performed by: INTERNAL MEDICINE

## 2022-06-05 PROCEDURE — 86140 C-REACTIVE PROTEIN: CPT

## 2022-06-05 PROCEDURE — 2580000003 HC RX 258: Performed by: INTERNAL MEDICINE

## 2022-06-05 PROCEDURE — 94640 AIRWAY INHALATION TREATMENT: CPT

## 2022-06-05 PROCEDURE — 6370000000 HC RX 637 (ALT 250 FOR IP): Performed by: FAMILY MEDICINE

## 2022-06-05 PROCEDURE — 2700000000 HC OXYGEN THERAPY PER DAY

## 2022-06-05 PROCEDURE — 85027 COMPLETE CBC AUTOMATED: CPT

## 2022-06-05 RX ORDER — METOPROLOL SUCCINATE 25 MG/1
25 TABLET, EXTENDED RELEASE ORAL 2 TIMES DAILY
Status: DISCONTINUED | OUTPATIENT
Start: 2022-06-05 | End: 2022-06-06 | Stop reason: HOSPADM

## 2022-06-05 RX ADMIN — PREGABALIN 100 MG: 100 CAPSULE ORAL at 21:52

## 2022-06-05 RX ADMIN — ACETAMINOPHEN 650 MG: 325 TABLET ORAL at 21:52

## 2022-06-05 RX ADMIN — HYDROCODONE BITARTRATE AND ACETAMINOPHEN 1 TABLET: 5; 325 TABLET ORAL at 10:18

## 2022-06-05 RX ADMIN — APIXABAN 5 MG: 5 TABLET, FILM COATED ORAL at 10:18

## 2022-06-05 RX ADMIN — CLOPIDOGREL BISULFATE 75 MG: 75 TABLET ORAL at 10:18

## 2022-06-05 RX ADMIN — ATORVASTATIN CALCIUM 80 MG: 40 TABLET, FILM COATED ORAL at 21:52

## 2022-06-05 RX ADMIN — COLCHICINE 0.3 MG: 0.6 TABLET ORAL at 10:17

## 2022-06-05 RX ADMIN — METOPROLOL SUCCINATE 25 MG: 25 TABLET, EXTENDED RELEASE ORAL at 21:52

## 2022-06-05 RX ADMIN — TORSEMIDE 20 MG: 20 TABLET ORAL at 10:19

## 2022-06-05 RX ADMIN — APIXABAN 5 MG: 5 TABLET, FILM COATED ORAL at 21:51

## 2022-06-05 RX ADMIN — SODIUM CHLORIDE, PRESERVATIVE FREE 10 ML: 5 INJECTION INTRAVENOUS at 10:20

## 2022-06-05 RX ADMIN — IPRATROPIUM BROMIDE AND ALBUTEROL SULFATE 1 AMPULE: .5; 2.5 SOLUTION RESPIRATORY (INHALATION) at 14:19

## 2022-06-05 RX ADMIN — IPRATROPIUM BROMIDE AND ALBUTEROL SULFATE 1 AMPULE: .5; 2.5 SOLUTION RESPIRATORY (INHALATION) at 08:37

## 2022-06-05 RX ADMIN — HYDROCODONE BITARTRATE AND ACETAMINOPHEN 1 TABLET: 5; 325 TABLET ORAL at 02:06

## 2022-06-05 RX ADMIN — PANTOPRAZOLE SODIUM 40 MG: 40 TABLET, DELAYED RELEASE ORAL at 06:01

## 2022-06-05 RX ADMIN — HYDROCODONE BITARTRATE AND ACETAMINOPHEN 1 TABLET: 5; 325 TABLET ORAL at 17:00

## 2022-06-05 RX ADMIN — PREGABALIN 100 MG: 100 CAPSULE ORAL at 10:18

## 2022-06-05 RX ADMIN — IPRATROPIUM BROMIDE AND ALBUTEROL SULFATE 1 AMPULE: .5; 2.5 SOLUTION RESPIRATORY (INHALATION) at 19:51

## 2022-06-05 RX ADMIN — PREGABALIN 100 MG: 100 CAPSULE ORAL at 15:38

## 2022-06-05 RX ADMIN — ZOLPIDEM TARTRATE 5 MG: 5 TABLET ORAL at 21:51

## 2022-06-05 RX ADMIN — SACUBITRIL AND VALSARTAN 1 TABLET: 24; 26 TABLET, FILM COATED ORAL at 21:51

## 2022-06-05 RX ADMIN — ROPINIROLE HYDROCHLORIDE 2 MG: 2 TABLET, FILM COATED ORAL at 21:51

## 2022-06-05 ASSESSMENT — PAIN SCALES - GENERAL
PAINLEVEL_OUTOF10: 5
PAINLEVEL_OUTOF10: 4
PAINLEVEL_OUTOF10: 5

## 2022-06-05 ASSESSMENT — PAIN DESCRIPTION - ONSET: ONSET: ON-GOING

## 2022-06-05 ASSESSMENT — PAIN SCALES - WONG BAKER
WONGBAKER_NUMERICALRESPONSE: 2
WONGBAKER_NUMERICALRESPONSE: 2

## 2022-06-05 ASSESSMENT — PAIN - FUNCTIONAL ASSESSMENT: PAIN_FUNCTIONAL_ASSESSMENT: PREVENTS OR INTERFERES SOME ACTIVE ACTIVITIES AND ADLS

## 2022-06-05 ASSESSMENT — PAIN DESCRIPTION - ORIENTATION: ORIENTATION: MID

## 2022-06-05 ASSESSMENT — PAIN DESCRIPTION - DESCRIPTORS: DESCRIPTORS: ACHING;DISCOMFORT

## 2022-06-05 ASSESSMENT — PAIN DESCRIPTION - FREQUENCY: FREQUENCY: CONTINUOUS

## 2022-06-05 ASSESSMENT — PAIN DESCRIPTION - PAIN TYPE: TYPE: CHRONIC PAIN

## 2022-06-05 ASSESSMENT — PAIN DESCRIPTION - LOCATION
LOCATION: BACK
LOCATION: BACK

## 2022-06-05 NOTE — PLAN OF CARE
Problem: Discharge Planning  Goal: Discharge to home or other facility with appropriate resources  Outcome: Progressing     Problem: Chronic Conditions and Co-morbidities  Goal: Patient's chronic conditions and co-morbidity symptoms are monitored and maintained or improved  Outcome: Progressing     Problem: Safety - Adult  Goal: Free from fall injury  Outcome: Progressing     Problem: ABCDS Injury Assessment  Goal: Absence of physical injury  Outcome: Progressing

## 2022-06-05 NOTE — PROGRESS NOTES
Cardiology Progress Note:    Narrative:  · Heart rates in the 80s and blood pressure in the 110s  · proBNP trending down but still ~18,000. CBC relatively stable with mild chronic anemia  · Verbalizes no complaints. On room air but slightly dyspneic upon sitting up      Objective:  Alert and oriented x3, anxious  JVP mildly elevated  Lungs are clear to auscultation with decreased breath sounds at the bases  Heart is regular rate and rhythm and no auscultable murmurs or rubs or gallops  No pitting edema. Recent Labs     06/05/22  0454 06/04/22  0549 06/03/22  0546   WBC 5.9 5.3 3.5*   HGB 10.3* 10.1* 9.5*   HCT 34.4 33.8* 31.4*   MCV 84.7 85.1 84.4    242 181       Lab Results   Component Value Date     06/05/2022    K 3.9 06/05/2022    K 4.0 05/31/2022    CL 96 06/05/2022    CO2 27 06/05/2022    BUN 21 06/05/2022    CREATININE 1.1 06/05/2022    GLUCOSE 127 06/05/2022    CALCIUM 8.6 06/05/2022        Allergies   Allergen Reactions    Dilaudid [Hydromorphone]          Assessment:  1. Anterior MI status post PCI  1. Coronary angiogram and PCI (5/28/2022-Dr. Harlen Goldberg): Radial access. Culprit was mid to distal LAD occlusion. There was PCI with 2 overlapping MARCELA with severe no reflow. MARIA LUZ I flow at the end of the procedure. No other obstructive disease noted. 2. Acute systolic heart failure  1. TTE technically challenging. Mild LV dilation with LVEF 30 to 35% (38% by biplane) without LV thrombus. Normal RV size and function. No apparent severe valve disease. 3. PAF on apixaban  4. Hypertension  5. Type 2 diabetes  6. Prior TIA  7.  BMI 44        Recommendations:  · Discontinue LifeVest per her request (see discussion yesterday)  · Continue clopidogrel plus apixaban for at least 12 months followed by aspirin plus apixaban  · Metop succinate 25 mg PO BID  · Sac-Jolynn 24-26 mg PO BID  · Continue spironolactone as is  · Continue torsemide 20 mg daily  · Discontinue colchicine  · Continue atorvastatin 80 mg daily. · If ambulates and asymptomatic may discharge today from cardiac standpoint. F/u with Dr. Gris Franco after discharge in 4 weeks with BMP and PCP f/u within a week        We will follow.       Nader Huber MD, [facility]  Interventional Cardiology/Structural Heart Disease  Office: [phone]

## 2022-06-05 NOTE — CARE COORDINATION
Left voicemail with rep at [phone] from [address] [place] [address] regarding possible discharge and to see if they can take pt today. [phone] no response from liaison at [address] [place] [address]. [employer] rn notified.

## 2022-06-05 NOTE — PROGRESS NOTES
Hospitalist Progress Note      SYNOPSIS: Patient admitted on 2022 for STEMI (ST elevation myocardial infarction) [facility] [place] [facility])  presented to [place] ER with complaint of chest pain. In the ER she was found to have STEMI with ST elevations in lead I and aVL and lead V2. She was given aspirin Brilinta, heparin bolus, started on heparin drip and transferred emergently to [facility] for cardiac catheterization. She was found to have acute 100% occlusion of mid to distal LAD ( ? Embolic event due to PAF versus ruptured plaque ), status post primary PCI using two drug-eluting stents with persistence of no reflow despite vasodilator therapy. 50%-60% mid PDA stenosis. PAF during PCI. Patient has persistent CP after procedure, started on colchicine. Patient will need placement       SUBJECTIVE:  Patient was seen and examined this morning, she reports that he is feeling good. Denies any shortness of breath or chest pain. No palpitations. No issues overnight. Temp (24hrs), Av.8 °F (36.6 °C), Min:97.5 °F (36.4 °C), Max:98 °F (36.7 °C)    DIET: ADULT DIET; Regular; Low Fat/Low Chol/High Fiber/2 gm Na  CODE: Limited    Intake/Output Summary (Last 24 hours) at 2022 1545  Last data filed at 2022 0900  Gross per 24 hour   Intake 240 ml   Output --   Net 240 ml         OBJECTIVE:    BP (!) 91/57   Pulse 60   Temp 97.5 °F (36.4 °C) (Temporal)   Resp 18   Ht 5' 5\" (1.651 m)   Wt 263 lb (119.3 kg)   SpO2 99% Comment: Patient request for comfort  BMI 43.77 kg/m²     General appearance:  awake, alert, and oriented to person, place, time, and purpose; appears stated age and cooperative; no apparent distress no labored breathing 2L  HEENT:  Conjunctivae/corneas clear. Neck: Supple. No jugular venous distention.    Respiratory: Diminished breath sounds (b/l)   Cardiovascular: rhythm regular; rate controlled; no murmurs  Abdomen: Soft, nontender, nondistended  Extremities:  peripheral pulses present; no peripheral edema; no ulcers  Musculoskeletal: No clubbing, cyanosis, trace edema (b/l)   Skin:  No rashes  on visible skin  Neurologic: awake, alert and following commands     ASSESSMENT and PLAN:    1.) STEMI/CAD/ischemic cardiomyopathy- S/p cardiac catheterization acute 100% occlusion of mid to distal LAD ( ? Embolic event due to PAF versus ruptured plaque ), status post primary PCI using two drug-eluting stents with persistence of no reflow despite vasodilator therapy. 50%-60% mid PDA stenosis. PAF during PCI. Per cardiology, Continue clopidogrel plus apixaban for at least 12 months followed by aspirin plus apixaban. Continue   statin. Toprol and entresto to be started depending on BP and HR. Echocardiogram showed ischemic cardiomyopathy with EF 02%, grade 2 diastolic dysfunction, moderately dilated left atrium. Cardiac rehab. Likely needs placement. The patient was started on metoprolol succinate, Entresto and spironolactone. 2.) Elevated proBNP-IV diuresis per cardiology (40mg IV BID). Transitioned to p.o. torsemide    3.)  Elevated sed rate/CRP- started on colchicine    4.) PAF- during PCI: as per cards. 5.) Hypertension: controlled. Medications as above    6.) Hyperlipidemia: resolved.     7.) Diabetes mellitus type II: controlled. 8.) Elevated LFTs: resolved. 9.) Obesity class III- BMI 48    10.) Fibromyalgia: analgesics PRN.     12.) Back pain: lidoderm patch ordered. Analgesics PRN.     13.) Diarrhea: Resolved    14.) Acute respiratory failure: patient is on 2L in the hospital; on no O2 at home. Will need ambulatory pulse OX closer to D/c. Discharge plan: The patient is ready to be discharged from the medical standpoint, however, pending placement.     Medications:  REVIEWED DAILY    Infusion Medications    sodium chloride       Scheduled Medications    metoprolol succinate  25 mg Oral BID    atorvastatin  80 mg Oral Nightly    torsemide  20 mg Oral Daily    lidocaine  1 patch TransDERmal Daily    ipratropium-albuterol  1 ampule Inhalation Q6H WA    spironolactone  25 mg Oral Daily    pregabalin  100 mg Oral TID    sodium chloride flush  5-40 mL IntraVENous 2 times per day    clopidogrel  75 mg Oral Daily    apixaban  5 mg Oral BID    sacubitril-valsartan  1 tablet Oral BID    pantoprazole  40 mg Oral QAM AC    rOPINIRole  2 mg Oral Nightly     PRN Meds: zolpidem, fentanNYL, LORazepam, HYDROcodone 5 mg - acetaminophen, sodium chloride flush, sodium chloride, acetaminophen, perflutren lipid microspheres, nitroGLYCERIN, [Held by provider] hydrOXYzine pamoate    Labs:     Recent Labs     06/03/22  0546 06/04/22  0549 06/05/22 0454   WBC 3.5* 5.3 5.9   HGB 9.5* 10.1* 10.3*   HCT 31.4* 33.8* 34.4    242 248       Recent Labs     06/03/22  0546 06/04/22  0549 06/05/22  0454    140 138   K 3.9 4.3 3.9   CL 98 97* 96*   CO2 28 28 27   BUN 18 19 21   CREATININE 1.0 1.2* 1.1*   CALCIUM 8.5* 8.6 8.6   PHOS 3.1 3.6 3.7       Recent Labs     06/03/22  0546 06/04/22  0549 06/05/22  0454   PROT 5.6* 6.1* 6.1*   ALKPHOS 82 87 86   ALT 15 15 13   AST 25 22 20   BILITOT 0.3 0.3 0.4       No results for input(s): INR in the last 72 hours. No results for input(s): Luly Plumemr in the last 72 hours. Chronic labs:    Lab Results   Component Value Date    CHOL 154 05/29/2022    TRIG 116 05/29/2022    HDL 54 05/29/2022    LDLCALC 77 05/29/2022    TSH 1.510 05/29/2022    INR 1.0 10/18/2016    LABA1C 6.1 (H) 05/29/2022       Radiology: REVIEWED DAILY    +++++++++++++++++++++++++++++++++++++++++++++++++  Christian Nguyen MD   [employer] - [address] [place] [address]  +++++++++++++++++++++++++++++++++++++++++++++++++  NOTE: This report was transcribed using voice recognition software. Every effort was made to ensure accuracy; however, inadvertent computerized transcription errors may be present.

## 2022-06-06 VITALS
RESPIRATION RATE: 20 BRPM | HEART RATE: 87 BPM | BODY MASS INDEX: 44.6 KG/M2 | TEMPERATURE: 97.3 F | OXYGEN SATURATION: 96 % | DIASTOLIC BLOOD PRESSURE: 62 MMHG | HEIGHT: 65 IN | SYSTOLIC BLOOD PRESSURE: 103 MMHG | WEIGHT: 267.7 LBS

## 2022-06-06 LAB
ALBUMIN SERPL-MCNC: 3.3 G/DL (ref 3.5–5.2)
ALP BLD-CCNC: 91 U/L (ref 35–104)
ALT SERPL-CCNC: 10 U/L (ref 0–32)
ANION GAP SERPL CALCULATED.3IONS-SCNC: 17 MMOL/L (ref 7–16)
AST SERPL-CCNC: 17 U/L (ref 0–31)
BILIRUB SERPL-MCNC: 0.4 MG/DL (ref 0–1.2)
BUN BLDV-MCNC: 22 MG/DL (ref 6–23)
CALCIUM SERPL-MCNC: 8.9 MG/DL (ref 8.6–10.2)
CHLORIDE BLD-SCNC: 95 MMOL/L (ref 98–107)
CO2: 25 MMOL/L (ref 22–29)
CREAT SERPL-MCNC: 1.2 MG/DL (ref 0.5–1)
GFR AFRICAN AMERICAN: 54
GFR NON-AFRICAN AMERICAN: 45 ML/MIN/1.73
GLUCOSE BLD-MCNC: 116 MG/DL (ref 74–99)
HCT VFR BLD CALC: 35.7 % (ref 34–48)
HEMOGLOBIN: 10.7 G/DL (ref 11.5–15.5)
MAGNESIUM: 2.1 MG/DL (ref 1.6–2.6)
MCH RBC QN AUTO: 25.4 PG (ref 26–35)
MCHC RBC AUTO-ENTMCNC: 30 % (ref 32–34.5)
MCV RBC AUTO: 84.8 FL (ref 80–99.9)
PDW BLD-RTO: 15.6 FL (ref 11.5–15)
PHOSPHORUS: 4 MG/DL (ref 2.5–4.5)
PLATELET # BLD: 325 E9/L (ref 130–450)
PMV BLD AUTO: 11.1 FL (ref 7–12)
POTASSIUM SERPL-SCNC: 4 MMOL/L (ref 3.5–5)
RBC # BLD: 4.21 E12/L (ref 3.5–5.5)
SARS-COV-2, NAAT: NOT DETECTED
SODIUM BLD-SCNC: 137 MMOL/L (ref 132–146)
TOTAL PROTEIN: 6.6 G/DL (ref 6.4–8.3)
WBC # BLD: 6.3 E9/L (ref 4.5–11.5)

## 2022-06-06 PROCEDURE — 80053 COMPREHEN METABOLIC PANEL: CPT

## 2022-06-06 PROCEDURE — 6370000000 HC RX 637 (ALT 250 FOR IP): Performed by: INTERNAL MEDICINE

## 2022-06-06 PROCEDURE — 94640 AIRWAY INHALATION TREATMENT: CPT

## 2022-06-06 PROCEDURE — 36415 COLL VENOUS BLD VENIPUNCTURE: CPT

## 2022-06-06 PROCEDURE — 84100 ASSAY OF PHOSPHORUS: CPT

## 2022-06-06 PROCEDURE — 87635 SARS-COV-2 COVID-19 AMP PRB: CPT

## 2022-06-06 PROCEDURE — 85027 COMPLETE CBC AUTOMATED: CPT

## 2022-06-06 PROCEDURE — 2700000000 HC OXYGEN THERAPY PER DAY

## 2022-06-06 PROCEDURE — 83735 ASSAY OF MAGNESIUM: CPT

## 2022-06-06 RX ORDER — HYDROCODONE BITARTRATE AND ACETAMINOPHEN 5; 325 MG/1; MG/1
1 TABLET ORAL EVERY 6 HOURS PRN
Qty: 15 TABLET | Refills: 0 | Status: SHIPPED | OUTPATIENT
Start: 2022-06-06 | End: 2022-06-11

## 2022-06-06 RX ORDER — METOPROLOL SUCCINATE 25 MG/1
25 TABLET, EXTENDED RELEASE ORAL 2 TIMES DAILY
Qty: 30 TABLET | Refills: 0 | Status: SHIPPED | OUTPATIENT
Start: 2022-06-06

## 2022-06-06 RX ORDER — SPIRONOLACTONE 25 MG/1
25 TABLET ORAL DAILY
Qty: 30 TABLET | Refills: 0 | Status: SHIPPED | OUTPATIENT
Start: 2022-06-06

## 2022-06-06 RX ORDER — TORSEMIDE 20 MG/1
20 TABLET ORAL DAILY
Qty: 30 TABLET | Refills: 0 | Status: SHIPPED | OUTPATIENT
Start: 2022-06-06

## 2022-06-06 RX ORDER — CLOPIDOGREL BISULFATE 75 MG/1
75 TABLET ORAL DAILY
Qty: 30 TABLET | Refills: 3 | Status: SHIPPED | OUTPATIENT
Start: 2022-06-06

## 2022-06-06 RX ADMIN — HYDROCODONE BITARTRATE AND ACETAMINOPHEN 1 TABLET: 5; 325 TABLET ORAL at 09:18

## 2022-06-06 RX ADMIN — SACUBITRIL AND VALSARTAN 1 TABLET: 24; 26 TABLET, FILM COATED ORAL at 09:18

## 2022-06-06 RX ADMIN — CLOPIDOGREL BISULFATE 75 MG: 75 TABLET ORAL at 09:18

## 2022-06-06 RX ADMIN — TORSEMIDE 20 MG: 20 TABLET ORAL at 09:18

## 2022-06-06 RX ADMIN — IPRATROPIUM BROMIDE AND ALBUTEROL SULFATE 1 AMPULE: .5; 2.5 SOLUTION RESPIRATORY (INHALATION) at 09:29

## 2022-06-06 RX ADMIN — PREGABALIN 100 MG: 100 CAPSULE ORAL at 09:19

## 2022-06-06 RX ADMIN — ACETAMINOPHEN 650 MG: 325 TABLET ORAL at 13:48

## 2022-06-06 RX ADMIN — APIXABAN 5 MG: 5 TABLET, FILM COATED ORAL at 09:18

## 2022-06-06 RX ADMIN — PANTOPRAZOLE SODIUM 40 MG: 40 TABLET, DELAYED RELEASE ORAL at 06:03

## 2022-06-06 RX ADMIN — SPIRONOLACTONE 25 MG: 25 TABLET ORAL at 09:19

## 2022-06-06 RX ADMIN — IPRATROPIUM BROMIDE AND ALBUTEROL SULFATE 1 AMPULE: .5; 2.5 SOLUTION RESPIRATORY (INHALATION) at 13:06

## 2022-06-06 RX ADMIN — METOPROLOL SUCCINATE 25 MG: 25 TABLET, EXTENDED RELEASE ORAL at 09:19

## 2022-06-06 ASSESSMENT — PAIN SCALES - GENERAL: PAINLEVEL_OUTOF10: 5

## 2022-06-06 NOTE — PROGRESS NOTES
Comprehensive Nutrition Assessment    Type and Reason for Visit:  Initial,RD Nutrition Re-Screen/LOS (Day 7)    Nutrition Recommendations/Plan:   1. Continue current diet order  2. No nutritional intervention needed at this time     Malnutrition Assessment:  Malnutrition Status:  No malnutrition (06/06/22 1158)    Context:  Acute Illness     Findings of the 6 clinical characteristics of malnutrition:  Energy Intake:  Mild decrease in energy intake (Comment) (25-75% average intake x 9 days)  Weight Loss:  Unable to assess     Body Fat Loss:  No significant body fat loss     Muscle Mass Loss:  No significant muscle mass loss    Fluid Accumulation:  Unable to assess     Strength:  Not Performed    Nutrition Assessment:    Pt adm w/ chest pain 2/2 STEMI/CAD. Pt s/p coronart angioplasty w/ stent (05/28) w/ noted PAF during PCI. PMHx TIA, HLD, DM, CAD, and fibromyalgia. Heart healthy diet education completed per patient request. Intakes varied since admit. Will continue to monitor. Nutrition Related Findings:    A&Ox4, weakness, edentulous, I/Os WDL, abd soft/rounded, +BS, +1 non pitting edema: Pt requested diet education on heart healthy diet. Provided infomation on heart healthy cooking and shopping practices. Provided patient with recommended foods and prefered sodium and fat content of foods. Wound Type: None       Current Nutrition Intake & Therapies:    Average Meal Intake: 51-75%,26-50% (varried intakes d/t pt reported meal quality)  Average Supplements Intake: None Ordered    Anthropometric Measures:  Height: 5' 5\" (165.1 cm)  Ideal Body Weight (IBW): 125 lbs (57 kg)    Admission Body Weight: 279 lb 1.6 oz (126.6 kg) (05/30 standing scale)  Current Body Weight: 267 lb 10.2 oz (121.4 kg) (06/06 Bed, wt of 239 lbs from 06/05 likely inaccurate), 214.1 % IBW.  Weight Source: Bed Scale  Current BMI (kg/m2): 44.5  Usual Body Weight:  (UTO d/t poor EMR actual wt hx)  BMI Categories: Obese Class 3 (BMI 40.0 or greater)    Estimated Daily Nutrient Needs:  Energy Requirements Based On: Kcal/kg  Weight Used for Energy Requirements: Current  Energy (kcal/day): 13-15 kcal/kg per CBW=  kcals/day  Weight Used for Protein Requirements: Ideal  Protein (g/day): 2-2.2 g/kg per IBW= 115-225 g/day  Method Used for Fluid Requirements: 1 ml/kcal  Fluid (ml/day):  ml    Nutrition Diagnosis:   No nutrition diagnosis at this time     Nutrition Interventions:   Food and/or Nutrient Delivery: Continue Current Diet  Nutrition Education/Counseling: Education completed     Goals:     Goals: Meet at least 75% of estimated needs,by next RD assessment     Nutrition Monitoring and Evaluation:   Behavioral-Environmental Outcomes: Knowledge or Skill  Food/Nutrient Intake Outcomes: Food and Nutrient Intake  Physical Signs/Symptoms Outcomes: Biochemical Data,Chewing or Swallowing,Fluid Status or Edema,Nutrition Focused Physical Findings,Skin,Weight    Discharge Planning:    No discharge needs at this time     Karyn Levy  Contact: [phone]

## 2022-06-06 NOTE — CARE COORDINATION
6/6 Update CM note. Discharge order noted. Spoke with Stephany from [facility] who stated patient was okay to discharge over weekend, unsure of why patient did not leave. Jaylyn Stovall stated she informed SW on Saturday and CM on Sunday that patient was okay to discharge. Pre-cert is good through today. COVID test ordered and given to bedside RN. Spoke with Shay Buchanan at [facility], wheelchair transport arranged for 1:30 PM. Patient notified at bedside and in agreement and states she will contact family regarding pickup. [employer] completed and transport paperwork in soft chart. Stephany and bedside RN Shay Buchanan notified of pickup time.      Aria Gillis, BSN, RN  [facility] SURG[facility] Case Management   Cell: [phone]

## 2022-06-06 NOTE — PLAN OF CARE
Problem: Discharge Planning  Goal: Discharge to home or other facility with appropriate resources  Outcome: Progressing  Flowsheets (Taken 6/5/2022 2309 by Nae Bhat, RN)  Discharge to home or other facility with appropriate resources:   Identify barriers to discharge with patient and caregiver   Arrange for needed discharge resources and transportation as appropriate   Arrange for interpreters to assist at discharge as needed   Identify discharge learning needs (meds, wound care, etc)   Refer to discharge planning if patient needs post-hospital services based on physician order or complex needs related to functional status, cognitive ability or social support system     Problem: Chronic Conditions and Co-morbidities  Goal: Patient's chronic conditions and co-morbidity symptoms are monitored and maintained or improved  Outcome: Progressing  Flowsheets (Taken 6/5/2022 2309 by Nae Bhat, RN)  Care Plan - Patient's Chronic Conditions and Co-Morbidity Symptoms are Monitored and Maintained or Improved:   Monitor and assess patient's chronic conditions and comorbid symptoms for stability, deterioration, or improvement   Collaborate with multidisciplinary team to address chronic and comorbid conditions and prevent exacerbation or deterioration   Update acute care plan with appropriate goals if chronic or comorbid symptoms are exacerbated and prevent overall improvement and discharge     Problem: Safety - Adult  Goal: Free from fall injury  Outcome: Progressing     Problem: ABCDS Injury Assessment  Goal: Absence of physical injury  Outcome: Progressing

## 2022-06-06 NOTE — DISCHARGE SUMMARY
Hospitalist Discharge Summary    Patient ID: Seven Booth   Patient : 1955  Patient's PCP: No primary care provider on file. Admit Date: 2022   Admitting Physician: Joanna Aquino DO    Discharge Date:  2022   Discharge Physician: Nichelle Ross MD   Discharge Condition: Stable  Discharge Disposition: [address] [facility] course in brief:  (Please refer to daily progress notes for a comprehensive review of the hospitalization by requesting medical records)  Patient admitted on 2022 for STEMI (ST elevation myocardial infarction) ([address]  presented to [facility] ER with complaint of chest pain.  In the ER she was found to have STEMI with ST elevations in lead I and aVL and lead V2.  She was given aspirin Brilinta, heparin bolus, started on heparin drip and transferred emergently to [facility] for cardiac catheterization.  She was found to have acute 100% occlusion of mid to distal LAD ( ? Embolic event due to PAF versus ruptured plaque ), status post primary PCI using two drug-eluting stents with persistence of no reflow despite vasodilator therapy. 50%-60% mid PDA stenosis. PAF during PCI. Patient has persistent CP after procedure, started on colchicine which was discontinued after. Patient will need placement. EP discussed the relative benefits and risks of the WCD and she was leaning more towards not wanting it which EP concur with.     Per EP recs:  Discontinue LifeVest per her request   Continue clopidogrel plus apixaban for at least 12 months followed by aspirin plus apixaban  Metop succinate 25 mg PO BID  Sac-Jolynn 24-26 mg PO BID  Continue spironolactone as is  Continue torsemide 20 mg daily  Started on colchicine which was d/c'ed    Consults:   IP CONSULT TO HEART FAILURE NURSE/COORDINATOR  IP CONSULT TO SOCIAL WORK    Discharge Diagnoses:  STEMI/CAD/ischemic cardiomyopathy  Paroxysmal atrial fibrillation  Hypertension  Hyperlipidemia  Diabetes mellitus type 2  Morbid obesity  Elevated LFTs  Back pain  Fibromyalgia  Acute hypoxic respiratory failure needing oxygen    Discharge Instructions / Follow up: Follow-up with PCP within 1 week of discharge. Follow-up with consultants as indicated by them. Compliance with medications as prescribed on discharge. No future appointments. The patient's condition is stable. At this time the patient is without objective evidence of an acute process requiring continuing hospitalization or inpatient management. They are stable for discharge with outpatient follow-up. I have spoken with the patient and discussed the results of the current hospitalization, in addition to providing specific details for the plan of care and counseling regarding the diagnosis and prognosis. The plan has been discussed in detail and they are aware of the specific conditions for emergent return, as well as the importance of follow-up. Their questions are answered at this time and they are agreeable with the plan for discharge to [place]. Continued appropriate risk factor modification of blood pressure, diabetes and serum lipids will remain essential to reducing risk of future atherosclerotic development    Activity: activity as tolerated    Physical exam:  General appearance:  awake, alert, and oriented to person, place, time, and purpose; appears stated age and cooperative; no apparent distress no labored breathing 2L  HEENT:  Conjunctivae/corneas clear. Neck: Supple. No jugular venous distention.    Respiratory: Diminished breath sounds (b/l)   Cardiovascular: rhythm regular; rate controlled; no murmurs  Abdomen: Soft, nontender, nondistended  Extremities:  peripheral pulses present; no peripheral edema; no ulcers  Musculoskeletal: No clubbing, cyanosis, trace edema (b/l)   Skin:  No rashes  on visible skin  Neurologic: awake, alert and following commands    Significant labs:  CBC:   Recent Labs     06/05/22  0454 06/06/22  0455   WBC 5.9 6.3   RBC 4.06 4.21   HGB 10.3* 10.7*   HCT 34.4 35.7   MCV 84.7 84.8   RDW 15.3* 15.6*    325     BMP:   Recent Labs     06/05/22 0454 06/06/22 0455    137   K 3.9 4.0   CL 96* 95*   CO2 27 25   BUN 21 22   CREATININE 1.1* 1.2*   MG 1.8 2.1   PHOS 3.7 4.0     LFT:  Recent Labs     06/05/22 0454 06/06/22 0455   PROT 6.1* 6.6   ALKPHOS 86 91   ALT 13 10   AST 20 17   BILITOT 0.4 0.4     PT/INR: No results for input(s): INR, APTT in the last 72 hours. BNP: No results for input(s): BNP in the last 72 hours. Hgb A1C:   Lab Results   Component Value Date    LABA1C 6.1 (H) 05/29/2022     Folate and B12: No results found for: NPYCIUTF90, No results found for: FOLATE  Thyroid Studies:   Lab Results   Component Value Date    TSH 1.510 05/29/2022       Urinalysis:    Lab Results   Component Value Date    NITRU Negative 10/18/2016    BLOODU Negative 10/18/2016    SPECGRAV 1.020 10/18/2016    GLUCOSEU Negative 10/18/2016       Imaging:  Echo Complete    Result Date: 5/29/2022  Transthoracic Echocardiography Report (TTE)  Demographics   Patient Name         Jamari Lira Gender                Female   Medical Record       [id number]        Room Number           7801  Number   Account #            [de-identified]       Procedure Date        05/29/2022   Corporate ID                         Ordering Physician    Alfredo Patel   Accession Number     [id number]      Referring Physician   Alfredo Patel   Date of Birth        1955      Sonographer           Rosangela Peterson   Age                  77 year(s)      Interpreting          Alfredo Patel                                       Physician                                        Any Other  Procedure Type of Study   TTE procedure:Echocardiogram W/Contrast.  Procedure Date Date: 05/29/2022 Start: 03:09 PM Study Location: Portable Technical Quality: Limited visualization Indications:STEMI. Patient Status: Routine Contrast Medium: Definity.  Amount - 3 ml Height: 65 inches Weight: 278 pounds BSA: 2.28 m^2 BMI: 46.26 kg/m^2 BP: 125/76 mmHg Allergies   - Other drug:(Dilaudid). Findings   Left Ventricle  Normal left ventricle size. Mild concentric left ventricular hypertrophy. There is anteroseptal and anteroapical akinesis. Stage II diastolic dysfunction. EF 30+/-5%. Right Ventricle  Normal right ventricular size and function. Tapse 2.4 cm. Left Atrium  The left atrium is moderately dilated. Right Atrium  Right Atrium is not clearly visualized. Mitral Valve  The mitral valve was not well visualized. Physiologic and/or trace mitral regurgitation is present. No evidence of mitral valve stenosis. Tricuspid Valve  The tricuspid valve was not well visualized. Mild tricuspid regurgitation. RVSP is 40-45 mmHg. Aortic Valve  The aortic valve is trileaflet. No evidence of aortic valve regurgitation. No hemodynamically significant aortic stenosis is present. Pulmonic Valve  The pulmonic valve was not well visualized. Physiologic and/or trace pulmonic regurgitation present. No evidence of pulmonic valve stenosis. Pericardial Effusion  There is trivial anterior pericardial effusion. Aorta  Aortic root dimension within normal limits. Miscellaneous  Inferior Vena Cava not well visualized. Conclusions   Summary  Technically limited study due to patient body habitus. Definity study was done. Moderately dilated left atrium. Mild concentric left ventricular hypertrophy. Mild tricuspid regurgitation. Moderate pulmonary hypertension. Grade 2 diastolic dysfunction. Trivial anterior pericardial effusion. Ischemic cardiomyopathy with an EF 30+/-5%.    Signature   ----------------------------------------------------------------  Electronically signed by Blanka MartinInterpreting physician) on  05/29/2022 06:24 PM  ----------------------------------------------------------------  M-Mode/2D Measurements & Calculations   LV Diastolic    LV Systolic Dimension: 3.4   AV Cusp Separation: 2 cmLA  Dimension: 5.2  cm                           Dimension: 4.1 cmAO Root  cm              LV Volume Diastolic: 669.1   Dimension: 2.6 cm  LV FS:34.6 %    ml  LV PW           LV Volume Systolic: 49.3 ml  Diastolic: 1.2  LV EDV/LV EDV Index: 129.2  cm              ml/57 ml/m^2LV ESV/LV ESV    RV Diastolic Dimension: 3.6  LV PW Systolic: Index: 28.9 NM/96LY/ m^2     cm  1.4 cm          EF Calculated: 62.5 %  Septum          LV Mass Index: 109 l/min*m^2 LA/Aorta: 7.35  Diastolic: 1.2  LV Length: 8.5 cm            Ascending Aorta: 3.3 cm  cm                                           LA volume/Index: 68.5 ml  Septum          LVOT: 2.1 cm                 /54.99EM/O^3  Systolic: 1.7                                RA Area: 21.7 cm^2  cm   LV Mass: 248.84  g  Doppler Measurements & Calculations   MV Peak E-Wave:   AV Peak Velocity: 1.68 m/s     LVOT Peak Velocity: 1.37  0.77 m/s          AV Peak Gradient: 11.22 mmHg   m/s  MV Peak A-Wave:   AV Mean Velocity: 1.13 m/s     LVOT Mean Velocity: 0.84  0.97 m/s          AV Mean Gradient: 6.1 mmHg     m/s  MV E/A Ratio: 0.8 AV VTI: 27 cm                  LVOT Peak Gradient: 7.5  MV Peak Gradient: AV Area (Continuity):2.64 cm^2 mmHgLVOT Mean Gradient:  4.5 mmHg                                         3.3 mmHg  MV Mean Gradient: LVOT VTI: 20.6 cm  2.4 mmHg          IVRT: 90 msec  MV Mean Velocity:  0.73 m/s          Pulm. Vein A Reversal          TR Velocity:3.13 m/s  MV Deceleration   Duration:134.9 msec            TR Gradient:39.19 mmHg  Time: 359 msec    Pulm. Vein D Velocity:0.34     PV Peak Velocity: 1.01  MV P1/2t: 50.5    m/sPulm. Vein A Reversal       m/s  msec              Velocity:0.42 m/s              PV Peak Gradient: 4.1  MVA by PHT:4.36   Pulm.  Vein S Velocity: 0.62    mmHg  cm^2              m/s                            PV Mean Velocity: 0.67  MV Area                                          m/s  (continuity): 3.6 PV Mean Gradient: 2.1  cm^2                                             mmHg  MV E' Septal  Velocity: 0.05                                   VA ED Velocity: 1.69 m/s  m/s  MV E' Lateral  Velocity: 6 m/s  [email or web address] [id number]    XR CHEST (2 VW)    Result Date: 6/2/2022  EXAMINATION: TWO XRAY VIEWS OF THE CHEST 6/2/2022 5:31 pm COMPARISON: 06/01/2022 HISTORY: ORDERING SYSTEM PROVIDED HISTORY: SOB TECHNOLOGIST PROVIDED HISTORY: Reason for exam:->SOB What reading provider will be dictating this exam?->CRC FINDINGS: The lungs are without acute focal process. Stable probable pleural effusion on the lateral view. No pneumothorax. Cardiomegaly. The osseous structures are without acute process. Stable probable pleural effusion on the lateral view. CT scan would be more definitive to further characterize if clinically warranted. XR CHEST (2 VW)    Result Date: 6/1/2022  EXAMINATION: TWO XRAY VIEWS OF THE CHEST 6/1/2022 2:47 pm COMPARISON: May 31, 2022 HISTORY: ORDERING SYSTEM PROVIDED HISTORY: Shortness of breath TECHNOLOGIST PROVIDED HISTORY: Reason for exam:->Shortness of breath What reading provider will be dictating this exam?->CRC FINDINGS: Persistent opacities in left lung base and left costophrenic sulcus. There are low lung volumes limiting this exam.  Prominent cardiac silhouette. No pneumothorax. Hazy opacities on lateral view at level of costophrenic sulcus could suggest small pleural effusion. Continued follow-up could be helpful for further evaluation.      XR CHEST PORTABLE    Result Date: 5/31/2022  EXAMINATION: ONE XRAY VIEW OF THE CHEST 5/31/2022 8:28 am COMPARISON: 05/29/2022 HISTORY: ORDERING SYSTEM PROVIDED HISTORY: CHF TECHNOLOGIST PROVIDED HISTORY: Reason for exam:->CHF What reading provider will be dictating this exam?->CRC FINDINGS: The cardiac tablet  Commonly known as: LIPITOR  Take 1 tablet by mouth daily     clopidogrel 75 MG tablet  Commonly known as: PLAVIX  Take 1 tablet by mouth daily     HYDROcodone-acetaminophen 5-325 MG per tablet  Commonly known as: NORCO  Take 1 tablet by mouth every 6 hours as needed for Pain for up to 5 days.   Replaces: HYDROcodone-acetaminophen 7.5-300 MG Tabs     metoprolol succinate 25 MG extended release tablet  Commonly known as: TOPROL XL  Take 1 tablet by mouth in the morning and at bedtime     sacubitril-valsartan 24-26 MG per tablet  Commonly known as: ENTRESTO  Take 1 tablet by mouth 2 times daily     spironolactone 25 MG tablet  Commonly known as: ALDACTONE  Take 1 tablet by mouth daily     torsemide 20 MG tablet  Commonly known as: DEMADEX  Take 1 tablet by mouth daily        CONTINUE taking these medications    hydrOXYzine HCl 50 MG tablet  Commonly known as: ATARAX     omeprazole 20 MG delayed release capsule  Commonly known as: PRILOSEC     pregabalin 100 MG capsule  Commonly known as: LYRICA     Requip 2 MG tablet  Generic drug: rOPINIRole     zolpidem 10 MG tablet  Commonly known as: AMBIEN        STOP taking these medications    HYDROcodone-acetaminophen 7.5-300 MG Tabs  Replaced by: HYDROcodone-acetaminophen 5-325 MG per tablet     propranolol 80 MG tablet  Commonly known as: INDERAL           Where to Get Your Medications      These medications were sent to [facility], [address] [address]    Phone: [phone]   · apixaban 5 MG Tabs tablet  · atorvastatin 80 MG tablet     These medications were sent to [facility], [address] [address]    Phone: [phone]   · clopidogrel 75 MG tablet  · metoprolol succinate 25 MG extended release tablet  · sacubitril-valsartan 24-26 MG per tablet  · spironolactone 25 MG tablet  · torsemide 20 MG tablet     You can get these medications from any pharmacy    Bring a paper prescription for each of these medications  · HYDROcodone-acetaminophen 5-325 MG per tablet         Time Spent on discharge is more than 45 minutes in the examination, evaluation, counseling and review of medications and discharge plan.    +++++++++++++++++++++++++++++++++++++++++++++++++  Benita Lin MD  [address]  +++++++++++++++++++++++++++++++++++++++++++++++++  NOTE: This report was transcribed using voice recognition software. Every effort was made to ensure accuracy; however, inadvertent computerized transcription errors may be present.

## 2022-06-07 ENCOUNTER — TELEPHONE (OUTPATIENT)
Dept: CARDIAC CATH/INVASIVE PROCEDURES | Age: 67
End: 2022-06-07

## 2022-06-07 RX ORDER — ATORVASTATIN CALCIUM 80 MG/1
80 TABLET, FILM COATED ORAL DAILY
Qty: 30 TABLET | Refills: 0 | Status: SHIPPED | OUTPATIENT
Start: 2022-06-07

## 2022-06-21 ENCOUNTER — HOSPITAL ENCOUNTER (OUTPATIENT)
Dept: HOSPITAL 83 - WOUNDCARE | Age: 67
End: 2022-06-21
Attending: NURSE PRACTITIONER
Payer: COMMERCIAL

## 2022-06-21 DIAGNOSIS — M15.9: ICD-10-CM
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
## 2022-06-21 DIAGNOSIS — I10: ICD-10-CM

## 2022-06-21 DIAGNOSIS — G89.29: ICD-10-CM

## 2022-06-21 DIAGNOSIS — R62.7: ICD-10-CM
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
## 2022-06-21 DIAGNOSIS — S01.102A: Primary | ICD-10-CM
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
## 2022-06-21 DIAGNOSIS — M84.9: ICD-10-CM
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
## 2022-06-30 ENCOUNTER — HOSPITAL ENCOUNTER (EMERGENCY)
Dept: HOSPITAL 83 - ED | Age: 67
Discharge: LEFT BEFORE DISCHARGE | End: 2022-06-30
Payer: COMMERCIAL

## 2022-06-30 VITALS — DIASTOLIC BLOOD PRESSURE: 63 MMHG

## 2022-06-30 VITALS — HEIGHT: 67.99 IN | BODY MASS INDEX: 39.56 KG/M2 | WEIGHT: 261 LBS

## 2022-06-30 DIAGNOSIS — R79.89: Primary | ICD-10-CM

## 2022-06-30 LAB
ALP SERPL-CCNC: 140 U/L (ref 45–117)
ALT SERPL W P-5'-P-CCNC: 216 U/L (ref 12–78)
AST SERPL-CCNC: 347 IU/L (ref 3–35)
BACTERIA #/AREA URNS HPF: (no result) /[HPF]
BASOPHILS # BLD AUTO: 0 10*3/UL (ref 0–0.1)
BASOPHILS NFR BLD AUTO: 0.4 % (ref 0–1)
BILIRUB UR QL STRIP: (no result)
BUN SERPL-MCNC: 31 MG/DL (ref 7–24)
CASTS URNS QL MICRO: (no result)
CHLORIDE SERPL-SCNC: 100 MMOL/L (ref 98–107)
CREAT SERPL-MCNC: 1.74 MG/DL (ref 0.55–1.02)
EOSINOPHIL # BLD AUTO: 0.1 10*3/UL (ref 0–0.4)
EOSINOPHIL # BLD AUTO: 1.6 % (ref 1–4)
EPI CELLS #/AREA URNS HPF: (no result) /[HPF]
ERYTHROCYTE [DISTWIDTH] IN BLOOD BY AUTOMATED COUNT: 15.7 % (ref 0–14.5)
HCT VFR BLD AUTO: 29.8 % (ref 37–47)
KETONES UR QL STRIP: (no result)
LYMPHOCYTES # BLD AUTO: 0.7 10*3/UL (ref 1.3–4.4)
LYMPHOCYTES NFR BLD AUTO: 11 % (ref 27–41)
MCH RBC QN AUTO: 24.4 PG (ref 27–31)
MCHC RBC AUTO-ENTMCNC: 29.9 G/DL (ref 33–37)
MCV RBC AUTO: 81.6 FL (ref 81–99)
MONOCYTES # BLD AUTO: 0.6 10*3/UL (ref 0.1–1)
MONOCYTES NFR BLD MANUAL: 9.1 % (ref 3–9)
MUCOUS THREADS URNS QL MICRO: (no result)
NEUT #: 5.2 10*3/UL (ref 2.3–7.9)
NEUT %: 77.3 % (ref 47–73)
NRBC BLD QL AUTO: 0 % (ref 0–0)
PH UR STRIP: 5.5 [PH] (ref 4.5–8)
PLATELET # BLD AUTO: 262 10*3/UL (ref 130–400)
PMV BLD AUTO: 9.9 FL (ref 9.6–12.3)
POTASSIUM SERPL-SCNC: 4 MMOL/L (ref 3.5–5.1)
PROT SERPL-MCNC: 6.3 GM/DL (ref 6.4–8.2)
RBC # BLD AUTO: 3.65 10*6/UL (ref 4.1–5.1)
RBC #/AREA URNS HPF: (no result) RBC/HPF (ref 0–2)
SODIUM SERPL-SCNC: 135 MMOL/L (ref 136–145)
SP GR UR: 1.02 (ref 1–1.03)
UROBILINOGEN UR STRIP-MCNC: 2 E.U./DL (ref 0–1)
WBC NRBC COR # BLD AUTO: 6.7 10*3/UL (ref 4.8–10.8)

## 2022-07-01 LAB — HCV AB S/CO SERPL IA: 0.1 (ref 0–0.9)

## 2022-07-12 RX ORDER — BUDESONIDE AND FORMOTEROL FUMARATE DIHYDRATE 160; 4.5 UG/1; UG/1
AEROSOL RESPIRATORY (INHALATION)
COMMUNITY
Start: 2022-06-09

## 2022-07-12 RX ORDER — HYDROCODONE BITARTRATE AND ACETAMINOPHEN 7.5; 325 MG/1; MG/1
1 TABLET ORAL 3 TIMES DAILY PRN
COMMUNITY
Start: 2022-06-11

## 2022-07-12 RX ORDER — DULOXETIN HYDROCHLORIDE 60 MG/1
CAPSULE, DELAYED RELEASE ORAL
COMMUNITY
Start: 2022-06-08

## 2022-07-12 RX ORDER — AMLODIPINE BESYLATE 5 MG/1
1 TABLET ORAL DAILY
COMMUNITY
Start: 2022-06-08

## 2022-07-12 RX ORDER — ONDANSETRON HYDROCHLORIDE 8 MG/1
TABLET, FILM COATED ORAL
COMMUNITY
Start: 2022-06-23

## 2022-07-12 RX ORDER — DICYCLOMINE HCL 20 MG
TABLET ORAL
COMMUNITY
Start: 2022-04-17

## 2022-07-12 RX ORDER — PROPRANOLOL HYDROCHLORIDE 80 MG/1
1 CAPSULE, EXTENDED RELEASE ORAL DAILY
COMMUNITY
Start: 2022-05-11

## 2022-07-12 RX ORDER — LISINOPRIL AND HYDROCHLOROTHIAZIDE 20; 12.5 MG/1; MG/1
1 TABLET ORAL DAILY
COMMUNITY
Start: 2022-06-08

## 2022-07-12 RX ORDER — ALBUTEROL SULFATE 90 UG/1
AEROSOL, METERED RESPIRATORY (INHALATION)
COMMUNITY
Start: 2022-06-09

## 2022-07-12 RX ORDER — CLOTRIMAZOLE 1 %
CREAM (GRAM) TOPICAL
COMMUNITY
Start: 2022-06-20

## 2022-07-12 RX ORDER — FLUTICASONE PROPIONATE AND SALMETEROL 250; 50 UG/1; UG/1
POWDER RESPIRATORY (INHALATION)
COMMUNITY
Start: 2022-06-26

## 2022-07-12 RX ORDER — PREGABALIN 75 MG/1
1 CAPSULE ORAL 2 TIMES DAILY
COMMUNITY
Start: 2022-04-12